# Patient Record
Sex: FEMALE | Race: WHITE | NOT HISPANIC OR LATINO | Employment: FULL TIME | URBAN - METROPOLITAN AREA
[De-identification: names, ages, dates, MRNs, and addresses within clinical notes are randomized per-mention and may not be internally consistent; named-entity substitution may affect disease eponyms.]

---

## 2023-04-12 ENCOUNTER — TELEPHONE (OUTPATIENT)
Dept: PRIMARY CARE | Facility: CLINIC | Age: 43
End: 2023-04-12
Payer: COMMERCIAL

## 2023-04-12 NOTE — TELEPHONE ENCOUNTER
Pt states that she feels that she probably had covid this past weekend. Pt did not test herself but says she felt similar to a few months back when she had covid. Pt is requesting steroids. She says that she has productive cough, pressure in her head, light headedness, and green drainage. Pt is aware that Dr. Benites is not in the office right now. Please advise.  [phone]

## 2023-04-18 ENCOUNTER — PATIENT MESSAGE (OUTPATIENT)
Dept: PRIMARY CARE | Facility: CLINIC | Age: 43
End: 2023-04-18
Payer: COMMERCIAL

## 2023-04-18 DIAGNOSIS — E03.9 HYPOTHYROIDISM, UNSPECIFIED TYPE: Primary | ICD-10-CM

## 2023-04-18 DIAGNOSIS — K21.9 GASTROESOPHAGEAL REFLUX DISEASE WITHOUT ESOPHAGITIS: ICD-10-CM

## 2023-04-18 RX ORDER — PANTOPRAZOLE SODIUM 20 MG/1
1 TABLET, DELAYED RELEASE ORAL DAILY
COMMUNITY
Start: 2022-06-24 | End: 2023-04-18 | Stop reason: SDUPTHER

## 2023-04-18 RX ORDER — PANTOPRAZOLE SODIUM 20 MG/1
20 TABLET, DELAYED RELEASE ORAL DAILY
Qty: 90 TABLET | Refills: 0 | Status: SHIPPED | OUTPATIENT
Start: 2023-04-18 | End: 2023-05-12 | Stop reason: SDUPTHER

## 2023-04-18 RX ORDER — LEVOTHYROXINE SODIUM 25 UG/1
25 TABLET ORAL DAILY
COMMUNITY
End: 2023-04-18 | Stop reason: SDUPTHER

## 2023-04-18 RX ORDER — LEVOTHYROXINE SODIUM 25 UG/1
25 TABLET ORAL DAILY
Qty: 90 TABLET | Refills: 0 | Status: SHIPPED | OUTPATIENT
Start: 2023-04-18 | End: 2023-07-14 | Stop reason: SDUPTHER

## 2023-05-12 ENCOUNTER — OFFICE VISIT (OUTPATIENT)
Dept: PRIMARY CARE | Facility: CLINIC | Age: 43
End: 2023-05-12
Payer: COMMERCIAL

## 2023-05-12 ENCOUNTER — LAB (OUTPATIENT)
Dept: LAB | Facility: LAB | Age: 43
End: 2023-05-12
Payer: COMMERCIAL

## 2023-05-12 VITALS
BODY MASS INDEX: 23.74 KG/M2 | SYSTOLIC BLOOD PRESSURE: 98 MMHG | HEIGHT: 62 IN | RESPIRATION RATE: 16 BRPM | TEMPERATURE: 98.7 F | DIASTOLIC BLOOD PRESSURE: 64 MMHG | WEIGHT: 129 LBS | HEART RATE: 71 BPM | OXYGEN SATURATION: 98 %

## 2023-05-12 DIAGNOSIS — E03.9 HYPOTHYROIDISM, UNSPECIFIED TYPE: Primary | ICD-10-CM

## 2023-05-12 DIAGNOSIS — K21.9 GASTROESOPHAGEAL REFLUX DISEASE WITHOUT ESOPHAGITIS: ICD-10-CM

## 2023-05-12 DIAGNOSIS — E03.9 HYPOTHYROIDISM, UNSPECIFIED TYPE: ICD-10-CM

## 2023-05-12 DIAGNOSIS — F90.0 ATTENTION DEFICIT HYPERACTIVITY DISORDER (ADHD), PREDOMINANTLY INATTENTIVE TYPE: ICD-10-CM

## 2023-05-12 DIAGNOSIS — J30.2 SEASONAL ALLERGIES: ICD-10-CM

## 2023-05-12 PROBLEM — F90.9 ADHD: Status: ACTIVE | Noted: 2023-05-12

## 2023-05-12 PROBLEM — K58.9 IBS (IRRITABLE BOWEL SYNDROME): Status: ACTIVE | Noted: 2023-05-12

## 2023-05-12 PROBLEM — K90.41 GLUTEN-SENSITIVE ENTEROPATHY: Status: ACTIVE | Noted: 2023-05-12

## 2023-05-12 PROBLEM — K31.7 GASTRIC POLYP: Status: ACTIVE | Noted: 2023-05-12

## 2023-05-12 PROBLEM — R79.0 LOW IRON STORES: Status: ACTIVE | Noted: 2023-05-12

## 2023-05-12 PROBLEM — G25.81 RESTLESS LEG SYNDROME: Status: ACTIVE | Noted: 2023-05-12

## 2023-05-12 LAB
THYROTROPIN (MIU/L) IN SER/PLAS BY DETECTION LIMIT <= 0.05 MIU/L: 2.2 MIU/L (ref 0.44–3.98)
THYROXINE (T4) FREE (NG/DL) IN SER/PLAS: 1.06 NG/DL (ref 0.61–1.12)

## 2023-05-12 PROCEDURE — 99214 OFFICE O/P EST MOD 30 MIN: CPT | Performed by: FAMILY MEDICINE

## 2023-05-12 PROCEDURE — 84443 ASSAY THYROID STIM HORMONE: CPT

## 2023-05-12 PROCEDURE — 36415 COLL VENOUS BLD VENIPUNCTURE: CPT

## 2023-05-12 PROCEDURE — 84439 ASSAY OF FREE THYROXINE: CPT

## 2023-05-12 RX ORDER — LEVOCETIRIZINE DIHYDROCHLORIDE 5 MG/1
TABLET, FILM COATED ORAL EVERY EVENING
COMMUNITY
End: 2023-05-12 | Stop reason: SDUPTHER

## 2023-05-12 RX ORDER — LEVOCETIRIZINE DIHYDROCHLORIDE 5 MG/1
5 TABLET, FILM COATED ORAL EVERY EVENING
Qty: 90 TABLET | Refills: 1 | Status: SHIPPED | OUTPATIENT
Start: 2023-05-12 | End: 2023-07-14 | Stop reason: ALTCHOICE

## 2023-05-12 RX ORDER — AZELASTINE 1 MG/ML
1 SPRAY, METERED NASAL 2 TIMES DAILY
Qty: 36 ML | Refills: 3 | Status: SHIPPED | OUTPATIENT
Start: 2023-05-12 | End: 2023-06-06 | Stop reason: SDUPTHER

## 2023-05-12 RX ORDER — DEXTROAMPHETAMINE SULFATE, DEXTROAMPHETAMINE SACCHARATE, AMPHETAMINE SULFATE AND AMPHETAMINE ASPARTATE 5; 5; 5; 5 MG/1; MG/1; MG/1; MG/1
20 CAPSULE, EXTENDED RELEASE ORAL EVERY MORNING
COMMUNITY
Start: 2023-04-17 | End: 2023-10-23 | Stop reason: SDUPTHER

## 2023-05-12 RX ORDER — PANTOPRAZOLE SODIUM 20 MG/1
20 TABLET, DELAYED RELEASE ORAL DAILY
Qty: 90 TABLET | Refills: 1 | Status: SHIPPED | OUTPATIENT
Start: 2023-05-12 | End: 2023-12-26

## 2023-05-12 RX ORDER — GABAPENTIN 100 MG/1
100 CAPSULE ORAL 3 TIMES DAILY
COMMUNITY
End: 2023-09-29 | Stop reason: SDUPTHER

## 2023-05-12 RX ORDER — LEVONORGESTREL 52 MG/1
INTRAUTERINE DEVICE INTRAUTERINE
COMMUNITY

## 2023-05-12 RX ORDER — AZELASTINE 1 MG/ML
1 SPRAY, METERED NASAL 2 TIMES DAILY
COMMUNITY
End: 2023-05-12 | Stop reason: SDUPTHER

## 2023-05-12 NOTE — PATIENT INSTRUCTIONS
Today we followed up on your Thyroid medication - we will check your labs and adjust the medication accordingly.  Our goal will be to have a TSH between 2 and 3.  We will closely monitor your symptoms to determine the optimal dosing.     We also followed up on your seasonal allergies and refilled your nasal spray  and xyzal    For your GERD I refilled your PPI and referred you to GI since you had gastric polyps in the past.    Please make an appointment to follow up for your Annual Physical.     We will take over prescribing your adhd meds once you are done with your last psychiarist appt in June. Please bring me a clinical summary to your next appt of your evaluation from your psychiatrist.

## 2023-05-12 NOTE — PROGRESS NOTES
"Subjective   Patient ID: Yaneli Cain is a 43 y.o. female who presents for Thyroid Problem and Allergies.    She had stopped her meds for a couple of months and felt tired but has been on it regularly now for a few months.     The nephrologist was happy with her labs her creatinine had jumped but it's back to normal now. She had a 24 hour urine done and didn't have enough protein to be really concerned on.  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  She is doing well on 20mg now.  She was on steroids recently for a sinus infection and did a virtual visit with [facility].  She had to increase her protonix during that time.      Her GERD has been going on since her 20's.  It would get severe with just a few bites of salsa.  She had an EGD in med school and some delayed gastric emptying was determine.  She has been on protonix for years now.  She had an EGD at [facility] showing some polyps in 2015 and she had a cscope at the time as well because her mom has ulcerative colitis.    Thyroid Problem         Review of Systems    Objective   BP 98/64   Pulse 71   Temp 37.1 °C (98.7 °F)   Resp 16   Ht 1.575 m (5' 2\")   Wt 58.5 kg (129 lb)   SpO2 98%   BMI 23.59 kg/m²     Physical Exam  Constitutional:       Appearance: Normal appearance.   HENT:      Head: Normocephalic and atraumatic.      Mouth/Throat:      Mouth: Mucous membranes are moist.   Eyes:      Pupils: Pupils are equal, round, and reactive to light.   Cardiovascular:      Rate and Rhythm: Normal rate and regular rhythm.      Pulses: Normal pulses.   Pulmonary:      Effort: Pulmonary effort is normal.      Breath sounds: Normal breath sounds.   Abdominal:      General: Bowel sounds are normal. There is no distension.      Palpations: There is no mass.      Tenderness: There is no abdominal tenderness. There is no guarding.   Musculoskeletal:      Cervical back: Normal range of motion and neck supple.   Neurological:      Mental Status: She is " alert.         Assessment/Plan   Diagnoses and all orders for this visit:  Hypothyroidism, unspecified type  -     Thyroxine, Free; Future  -     Thyroid Stimulating Hormone; Future  Seasonal allergies  -     azelastine (Astelin) 137 mcg (0.1 %) nasal spray; Administer 1 spray into each nostril 2 times a day. Use in each nostril as directed  -     levocetirizine (Xyzal) 5 mg tablet; Take 1 tablet (5 mg) by mouth once daily in the evening.  Gastroesophageal reflux disease without esophagitis  -     Referral to Gastroenterology; Future  -     pantoprazole (ProtoNix) 20 mg EC tablet; Take 1 tablet (20 mg) by mouth once daily.

## 2023-06-06 DIAGNOSIS — J30.2 SEASONAL ALLERGIES: ICD-10-CM

## 2023-06-06 RX ORDER — AZELASTINE 1 MG/ML
1 SPRAY, METERED NASAL 2 TIMES DAILY
Qty: 3 ML | Refills: 3 | Status: SHIPPED | OUTPATIENT
Start: 2023-06-06 | End: 2024-06-05

## 2023-06-06 NOTE — TELEPHONE ENCOUNTER
Duplicate, refuse. -NA   V-Y Plasty Text: The defect edges were debeveled with a #15 scalpel blade. Given the location of the defect, shape of the defect and the proximity to free margins an V-Y advancement flap was deemed most appropriate. Using a sterile surgical marker, an appropriate advancement flap was drawn incorporating the defect and placing the expected incisions within the relaxed skin tension lines where possible. The area thus outlined was incised deep to adipose tissue with a #15 scalpel blade. The skin margins were undermined to an appropriate distance in all directions utilizing iris scissors.

## 2023-07-14 ENCOUNTER — OFFICE VISIT (OUTPATIENT)
Dept: PRIMARY CARE | Facility: CLINIC | Age: 43
End: 2023-07-14
Payer: COMMERCIAL

## 2023-07-14 VITALS
OXYGEN SATURATION: 98 % | BODY MASS INDEX: 23.74 KG/M2 | DIASTOLIC BLOOD PRESSURE: 72 MMHG | HEART RATE: 72 BPM | RESPIRATION RATE: 16 BRPM | HEIGHT: 62 IN | WEIGHT: 129 LBS | TEMPERATURE: 98.4 F | SYSTOLIC BLOOD PRESSURE: 102 MMHG

## 2023-07-14 DIAGNOSIS — J30.2 SEASONAL ALLERGIES: ICD-10-CM

## 2023-07-14 DIAGNOSIS — F90.0 ATTENTION DEFICIT HYPERACTIVITY DISORDER (ADHD), PREDOMINANTLY INATTENTIVE TYPE: ICD-10-CM

## 2023-07-14 DIAGNOSIS — Z12.31 BREAST CANCER SCREENING BY MAMMOGRAM: Primary | ICD-10-CM

## 2023-07-14 DIAGNOSIS — Z00.00 HEALTHCARE MAINTENANCE: ICD-10-CM

## 2023-07-14 DIAGNOSIS — E03.9 HYPOTHYROIDISM, UNSPECIFIED TYPE: ICD-10-CM

## 2023-07-14 LAB
AMPHETAMINE (PRESENCE) IN URINE BY SCREEN METHOD: ABNORMAL
BARBITURATES PRESENCE IN URINE BY SCREEN METHOD: ABNORMAL
BENZODIAZEPINE (PRESENCE) IN URINE BY SCREEN METHOD: ABNORMAL
CANNABINOIDS IN URINE BY SCREEN METHOD: ABNORMAL
COCAINE (PRESENCE) IN URINE BY SCREEN METHOD: ABNORMAL
DRUG SCREEN COMMENT URINE: ABNORMAL
FENTANYL URINE: ABNORMAL
METHADONE (PRESENCE) IN URINE BY SCREEN METHOD: ABNORMAL
OPIATES (PRESENCE) IN URINE BY SCREEN METHOD: ABNORMAL
OXYCODONE (PRESENCE) IN URINE BY SCREEN METHOD: ABNORMAL
PHENCYCLIDINE (PRESENCE) IN URINE BY SCREEN METHOD: ABNORMAL
POC APPEARANCE, URINE: CLEAR
POC BILIRUBIN, URINE: NEGATIVE
POC BLOOD, URINE: NEGATIVE
POC COLOR, URINE: YELLOW
POC GLUCOSE, URINE: NEGATIVE MG/DL
POC KETONES, URINE: NEGATIVE MG/DL
POC LEUKOCYTES, URINE: NEGATIVE
POC NITRITE,URINE: NEGATIVE
POC PH, URINE: 6 PH
POC PROTEIN, URINE: NEGATIVE MG/DL
POC SPECIFIC GRAVITY, URINE: <=1.005
POC UROBILINOGEN, URINE: 0.2 EU/DL

## 2023-07-14 PROCEDURE — 1036F TOBACCO NON-USER: CPT | Performed by: FAMILY MEDICINE

## 2023-07-14 PROCEDURE — 81002 URINALYSIS NONAUTO W/O SCOPE: CPT | Performed by: FAMILY MEDICINE

## 2023-07-14 PROCEDURE — 99396 PREV VISIT EST AGE 40-64: CPT | Performed by: FAMILY MEDICINE

## 2023-07-14 PROCEDURE — 80324 DRUG SCREEN AMPHETAMINES 1/2: CPT

## 2023-07-14 PROCEDURE — 80307 DRUG TEST PRSMV CHEM ANLYZR: CPT

## 2023-07-14 RX ORDER — CALCIUM CARBONATE 300MG(750)
400 TABLET,CHEWABLE ORAL DAILY
COMMUNITY

## 2023-07-14 RX ORDER — LEVOCETIRIZINE DIHYDROCHLORIDE 5 MG/1
10 TABLET, FILM COATED ORAL EVERY EVENING
Qty: 180 TABLET | Refills: 1 | Status: SHIPPED | OUTPATIENT
Start: 2023-07-14 | End: 2023-09-29 | Stop reason: SDUPTHER

## 2023-07-14 RX ORDER — LEVOTHYROXINE SODIUM 25 UG/1
25 TABLET ORAL DAILY
Qty: 90 TABLET | Refills: 1 | Status: SHIPPED | OUTPATIENT
Start: 2023-07-14 | End: 2023-12-26

## 2023-07-14 NOTE — PATIENT INSTRUCTIONS
Today we performed your Annual Physical Exam.  Your preventative health care, labs and vaccinations have been reviewed and are up to date.     Your vaccines are up to date.    Follow up in 6 months for a medication check    Follow up in 1 year for your Complete Physical Exam

## 2023-07-14 NOTE — PROGRESS NOTES
"Subjective   Patient ID: Yaneli Cain is a 43 y.o. female who presents for Annual Exam.    HPI   Dentist and Eye Doctor appointments: UTD  Immunizations: UTD  Diet: gluten free,high protein, lots of fruits and veggies  Exercise: Pilates, walks a lot, biking  Supplements: Magnesium and chamomile supplements, iron  Menstrual Cycles: light spotting,IUD  Sexually Active: Yes  Pregnancy History:   Cancer Screening:    Cervical: Dr. Armando   Breast: due   Colon: seeing GI Monday for GERD; had a cscope in the past   Skin: wears sunscreen   DEXA: N/A         Review of Systems    Objective   /72   Pulse 72   Temp 36.9 °C (98.4 °F)   Resp 16   Ht 1.575 m (5' 2\")   Wt 58.5 kg (129 lb)   SpO2 98%   BMI 23.59 kg/m²     Physical Exam  Constitutional:       General: She is not in acute distress.     Appearance: She is well-developed. She is not diaphoretic.   HENT:      Head: Normocephalic and atraumatic.      Right Ear: Tympanic membrane normal.      Left Ear: Tympanic membrane normal.      Nose: Nose normal.      Mouth/Throat:      Mouth: Mucous membranes are moist.   Eyes:      General: No scleral icterus.     Pupils: Pupils are equal, round, and reactive to light.   Neck:      Thyroid: No thyromegaly.      Vascular: No JVD.   Cardiovascular:      Rate and Rhythm: Normal rate and regular rhythm.      Heart sounds: Normal heart sounds. No murmur heard.     No friction rub. No gallop.   Pulmonary:      Effort: Pulmonary effort is normal. No respiratory distress.      Breath sounds: Normal breath sounds. No wheezing or rales.   Chest:      Chest wall: No tenderness.   Abdominal:      General: Bowel sounds are normal. There is no distension.      Palpations: Abdomen is soft. There is no mass.      Tenderness: There is no abdominal tenderness. There is no rebound.   Musculoskeletal:         General: Normal range of motion.      Cervical back: Normal range of motion and neck supple.   Lymphadenopathy:      Cervical: " No cervical adenopathy.   Skin:     General: Skin is warm and dry.   Neurological:      General: No focal deficit present.      Mental Status: She is alert and oriented to person, place, and time.      Deep Tendon Reflexes: Reflexes normal.   Psychiatric:         Mood and Affect: Mood normal.         Behavior: Behavior normal.         Assessment/Plan   Diagnoses and all orders for this visit:  Breast cancer screening by mammogram  -     BI mammo bilateral screening tomosynthesis; Future  Healthcare maintenance  -     POCT UA (nonautomated) manually resulted  -     CBC; Future  -     Comprehensive Metabolic Panel; Future  -     Lipid Panel; Future  -     Vitamin D 25 hydroxy; Future  Hypothyroidism, unspecified type  -     levothyroxine (Synthroid, Levoxyl) 25 mcg tablet; Take 1 tablet (25 mcg) by mouth once daily. as directed  Seasonal allergies  -     levocetirizine (Xyzal) 5 mg tablet; Take 2 tablets (10 mg) by mouth once daily in the evening.

## 2023-07-19 LAB
AMPHETAMINES,URINE: 1748 NG/ML
MDA,URINE: <200 NG/ML
MDEA,URINE: <200 NG/ML
MDMA,UR: <200 NG/ML
METHAMPHETAMINE QUANTITATIVE URINE: <200 NG/ML
PHENTERMINE,UR: <200 NG/ML

## 2023-07-28 DIAGNOSIS — J30.2 SEASONAL ALLERGIES: Primary | ICD-10-CM

## 2023-07-28 RX ORDER — METHYLPREDNISOLONE 4 MG/1
TABLET ORAL
Qty: 21 TABLET | Refills: 0 | Status: SHIPPED | OUTPATIENT
Start: 2023-07-28 | End: 2023-08-04

## 2023-09-29 ENCOUNTER — OFFICE VISIT (OUTPATIENT)
Dept: PRIMARY CARE | Facility: CLINIC | Age: 43
End: 2023-09-29
Payer: COMMERCIAL

## 2023-09-29 VITALS
SYSTOLIC BLOOD PRESSURE: 118 MMHG | OXYGEN SATURATION: 100 % | HEART RATE: 80 BPM | BODY MASS INDEX: 24.11 KG/M2 | TEMPERATURE: 98.2 F | HEIGHT: 62 IN | WEIGHT: 131 LBS | DIASTOLIC BLOOD PRESSURE: 68 MMHG

## 2023-09-29 DIAGNOSIS — F90.0 ATTENTION DEFICIT HYPERACTIVITY DISORDER (ADHD), PREDOMINANTLY INATTENTIVE TYPE: Primary | ICD-10-CM

## 2023-09-29 DIAGNOSIS — E03.9 HYPOTHYROIDISM, UNSPECIFIED TYPE: ICD-10-CM

## 2023-09-29 DIAGNOSIS — G25.81 RESTLESS LEG SYNDROME: ICD-10-CM

## 2023-09-29 DIAGNOSIS — J32.9 RECURRENT SINUS INFECTIONS: ICD-10-CM

## 2023-09-29 DIAGNOSIS — J30.2 SEASONAL ALLERGIES: ICD-10-CM

## 2023-09-29 PROCEDURE — 99214 OFFICE O/P EST MOD 30 MIN: CPT | Performed by: FAMILY MEDICINE

## 2023-09-29 PROCEDURE — 90471 IMMUNIZATION ADMIN: CPT | Performed by: FAMILY MEDICINE

## 2023-09-29 PROCEDURE — 1036F TOBACCO NON-USER: CPT | Performed by: FAMILY MEDICINE

## 2023-09-29 PROCEDURE — 90686 IIV4 VACC NO PRSV 0.5 ML IM: CPT | Performed by: FAMILY MEDICINE

## 2023-09-29 RX ORDER — CETIRIZINE HYDROCHLORIDE 10 MG/1
TABLET ORAL
COMMUNITY
Start: 2022-06-24 | End: 2024-01-31 | Stop reason: WASHOUT

## 2023-09-29 RX ORDER — LEVOCETIRIZINE DIHYDROCHLORIDE 5 MG/1
10 TABLET, FILM COATED ORAL EVERY EVENING
Qty: 180 TABLET | Refills: 1 | Status: SHIPPED | OUTPATIENT
Start: 2023-09-29 | End: 2023-09-30 | Stop reason: SDUPTHER

## 2023-09-29 RX ORDER — FLUTICASONE PROPIONATE 50 MCG
SPRAY, SUSPENSION (ML) NASAL EVERY 24 HOURS
COMMUNITY

## 2023-09-29 RX ORDER — ALBUTEROL SULFATE 90 UG/1
AEROSOL, METERED RESPIRATORY (INHALATION) EVERY 4 HOURS
COMMUNITY

## 2023-09-29 RX ORDER — GABAPENTIN 100 MG/1
100 CAPSULE ORAL 3 TIMES DAILY
Qty: 90 CAPSULE | Refills: 1 | Status: SHIPPED | OUTPATIENT
Start: 2023-09-29 | End: 2024-01-31 | Stop reason: SDUPTHER

## 2023-09-29 NOTE — PROGRESS NOTES
"Subjective   Patient ID: Yaneli Cain is a 43 y.o. female who presents for Medication Follow up (Adderral) and Sinusitis (Requesting ENT referral ).    She has been doing adderall for her ADHD and it is working well. She was seeing a psychiatrist in the past.      She is on gabapentin for restless leg and takes it every night for the last 6 months or so    Sinusitis         Review of Systems    Objective   /68   Pulse 80   Temp 36.8 °C (98.2 °F) (Temporal)   Ht 1.581 m (5' 2.25\")   Wt 59.4 kg (131 lb)   SpO2 100%   BMI 23.77 kg/m²     Physical Exam  Constitutional:       Appearance: Normal appearance.   HENT:      Head: Normocephalic.      Mouth/Throat:      Mouth: Mucous membranes are moist.   Eyes:      Pupils: Pupils are equal, round, and reactive to light.   Cardiovascular:      Rate and Rhythm: Normal rate and regular rhythm.   Pulmonary:      Effort: Pulmonary effort is normal.      Breath sounds: Normal breath sounds.   Musculoskeletal:      Cervical back: Normal range of motion.   Skin:     General: Skin is warm.   Neurological:      General: No focal deficit present.      Mental Status: She is alert and oriented to person, place, and time.   Psychiatric:         Mood and Affect: Mood normal.         Assessment/Plan   Diagnoses and all orders for this visit:  Attention deficit hyperactivity disorder (ADHD), predominantly inattentive type  Seasonal allergies  -     levocetirizine (Xyzal) 5 mg tablet; Take 2 tablets (10 mg) by mouth once daily in the evening.  Restless leg syndrome  -     gabapentin (Neurontin) 100 mg capsule; Take 1 capsule (100 mg) by mouth 3 times a day.  -     Iron and TIBC; Future  Hypothyroidism, unspecified type  -     Thyroxine, Free; Future  -     Thyroid Stimulating Hormone; Future  Recurrent sinus infections  -     Referral to ENT; Future  Other orders  -     Flu vaccine (IIV4) age 6 months and greater, preservative free         "

## 2023-09-29 NOTE — PATIENT INSTRUCTIONS
Today we followed up on your ADHD medication.  You are doing well.      Follow up in 3 months to monitor your medication    For your recurrent sinus infections and serous otitis I have referred you to ENT for consultation    You were given a seasonal Flu vaccine today     For your Restless leg I have refilled your gabapentin and advised you to get your labs done from your visit in July    Today we followed up on your Thyroid medication - we will check your labs and adjust the medication accordingly.  Our goal will be to have a TSH between 2 and 3.  We will closely monitor your symptoms to determine the optimal dosing.     For your seasonal and environmental allergies I have refilled our xyzal

## 2023-10-06 ENCOUNTER — LAB (OUTPATIENT)
Dept: LAB | Facility: LAB | Age: 43
End: 2023-10-06
Payer: COMMERCIAL

## 2023-10-06 DIAGNOSIS — E03.9 HYPOTHYROIDISM, UNSPECIFIED TYPE: ICD-10-CM

## 2023-10-06 DIAGNOSIS — G25.81 RESTLESS LEG SYNDROME: ICD-10-CM

## 2023-10-06 DIAGNOSIS — Z00.00 HEALTHCARE MAINTENANCE: ICD-10-CM

## 2023-10-06 LAB
25(OH)D3 SERPL-MCNC: 52 NG/ML (ref 30–100)
ALBUMIN SERPL BCP-MCNC: 4.3 G/DL (ref 3.4–5)
ALP SERPL-CCNC: 65 U/L (ref 33–110)
ALT SERPL W P-5'-P-CCNC: 14 U/L (ref 7–45)
ANION GAP SERPL CALC-SCNC: 12 MMOL/L (ref 10–20)
AST SERPL W P-5'-P-CCNC: 20 U/L (ref 9–39)
BILIRUB SERPL-MCNC: 1 MG/DL (ref 0–1.2)
BUN SERPL-MCNC: 14 MG/DL (ref 6–23)
CALCIUM SERPL-MCNC: 9.7 MG/DL (ref 8.6–10.3)
CHLORIDE SERPL-SCNC: 101 MMOL/L (ref 98–107)
CHOLEST SERPL-MCNC: 128 MG/DL (ref 0–199)
CHOLESTEROL/HDL RATIO: 3.1
CO2 SERPL-SCNC: 28 MMOL/L (ref 21–32)
CREAT SERPL-MCNC: 1.14 MG/DL (ref 0.5–1.05)
ERYTHROCYTE [DISTWIDTH] IN BLOOD BY AUTOMATED COUNT: 12.5 % (ref 11.5–14.5)
GFR SERPL CREATININE-BSD FRML MDRD: 62 ML/MIN/1.73M*2
GLUCOSE SERPL-MCNC: 77 MG/DL (ref 74–99)
HCT VFR BLD AUTO: 40.9 % (ref 36–46)
HDLC SERPL-MCNC: 41.1 MG/DL
HGB BLD-MCNC: 13.2 G/DL (ref 12–16)
IRON SATN MFR SERPL: 38 % (ref 25–45)
IRON SERPL-MCNC: 125 UG/DL (ref 35–150)
LDLC SERPL CALC-MCNC: 70 MG/DL (ref 140–190)
MCH RBC QN AUTO: 30.4 PG (ref 26–34)
MCHC RBC AUTO-ENTMCNC: 32.3 G/DL (ref 32–36)
MCV RBC AUTO: 94 FL (ref 80–100)
NON HDL CHOLESTEROL: 87 MG/DL (ref 0–149)
NRBC BLD-RTO: 0 /100 WBCS (ref 0–0)
PLATELET # BLD AUTO: 502 X10*3/UL (ref 150–450)
PMV BLD AUTO: 10.6 FL (ref 7.5–11.5)
POTASSIUM SERPL-SCNC: 4.6 MMOL/L (ref 3.5–5.3)
PROT SERPL-MCNC: 7.1 G/DL (ref 6.4–8.2)
RBC # BLD AUTO: 4.34 X10*6/UL (ref 4–5.2)
SODIUM SERPL-SCNC: 136 MMOL/L (ref 136–145)
T4 FREE SERPL-MCNC: 0.83 NG/DL (ref 0.61–1.12)
TIBC SERPL-MCNC: 327 UG/DL (ref 240–445)
TRIGL SERPL-MCNC: 87 MG/DL (ref 0–149)
TSH SERPL-ACNC: 2.69 MIU/L (ref 0.44–3.98)
UIBC SERPL-MCNC: 202 UG/DL (ref 110–370)
VLDL: 17 MG/DL (ref 0–40)
WBC # BLD AUTO: 11 X10*3/UL (ref 4.4–11.3)

## 2023-10-06 PROCEDURE — 83540 ASSAY OF IRON: CPT

## 2023-10-06 PROCEDURE — 82306 VITAMIN D 25 HYDROXY: CPT

## 2023-10-06 PROCEDURE — 84439 ASSAY OF FREE THYROXINE: CPT

## 2023-10-06 PROCEDURE — 83550 IRON BINDING TEST: CPT

## 2023-10-06 PROCEDURE — 84443 ASSAY THYROID STIM HORMONE: CPT

## 2023-10-06 PROCEDURE — 80053 COMPREHEN METABOLIC PANEL: CPT

## 2023-10-06 PROCEDURE — 80061 LIPID PANEL: CPT

## 2023-10-06 PROCEDURE — 85027 COMPLETE CBC AUTOMATED: CPT

## 2023-10-06 PROCEDURE — 36415 COLL VENOUS BLD VENIPUNCTURE: CPT

## 2023-10-20 ENCOUNTER — PATIENT MESSAGE (OUTPATIENT)
Dept: PRIMARY CARE | Facility: CLINIC | Age: 43
End: 2023-10-20
Payer: COMMERCIAL

## 2023-10-20 DIAGNOSIS — F90.0 ATTENTION DEFICIT HYPERACTIVITY DISORDER (ADHD), PREDOMINANTLY INATTENTIVE TYPE: Primary | ICD-10-CM

## 2023-10-23 RX ORDER — DEXTROAMPHETAMINE SACCHARATE, AMPHETAMINE ASPARTATE MONOHYDRATE, DEXTROAMPHETAMINE SULFATE AND AMPHETAMINE SULFATE 5; 5; 5; 5 MG/1; MG/1; MG/1; MG/1
20 CAPSULE, EXTENDED RELEASE ORAL EVERY MORNING
Qty: 90 CAPSULE | Refills: 0 | Status: SHIPPED | OUTPATIENT
Start: 2023-10-23 | End: 2024-01-31 | Stop reason: SDUPTHER

## 2023-12-26 DIAGNOSIS — K21.9 GASTROESOPHAGEAL REFLUX DISEASE WITHOUT ESOPHAGITIS: ICD-10-CM

## 2023-12-26 DIAGNOSIS — E03.9 HYPOTHYROIDISM, UNSPECIFIED TYPE: ICD-10-CM

## 2023-12-26 RX ORDER — LEVOTHYROXINE SODIUM 25 UG/1
25 TABLET ORAL DAILY
Qty: 90 TABLET | Refills: 0 | Status: SHIPPED | OUTPATIENT
Start: 2023-12-26 | End: 2024-01-31 | Stop reason: SDUPTHER

## 2023-12-26 RX ORDER — PANTOPRAZOLE SODIUM 20 MG/1
20 TABLET, DELAYED RELEASE ORAL DAILY
Qty: 90 TABLET | Refills: 0 | Status: SHIPPED | OUTPATIENT
Start: 2023-12-26 | End: 2024-01-31 | Stop reason: SDUPTHER

## 2024-01-12 ENCOUNTER — APPOINTMENT (OUTPATIENT)
Dept: PRIMARY CARE | Facility: CLINIC | Age: 44
End: 2024-01-12
Payer: COMMERCIAL

## 2024-01-31 ENCOUNTER — OFFICE VISIT (OUTPATIENT)
Dept: PRIMARY CARE | Facility: CLINIC | Age: 44
End: 2024-01-31
Payer: COMMERCIAL

## 2024-01-31 VITALS
SYSTOLIC BLOOD PRESSURE: 100 MMHG | TEMPERATURE: 98 F | DIASTOLIC BLOOD PRESSURE: 64 MMHG | BODY MASS INDEX: 23.59 KG/M2 | OXYGEN SATURATION: 100 % | HEART RATE: 57 BPM | RESPIRATION RATE: 16 BRPM | WEIGHT: 130 LBS

## 2024-01-31 DIAGNOSIS — G25.81 RESTLESS LEG SYNDROME: ICD-10-CM

## 2024-01-31 DIAGNOSIS — E03.9 HYPOTHYROIDISM, UNSPECIFIED TYPE: ICD-10-CM

## 2024-01-31 DIAGNOSIS — K21.9 GASTROESOPHAGEAL REFLUX DISEASE WITHOUT ESOPHAGITIS: ICD-10-CM

## 2024-01-31 DIAGNOSIS — F90.0 ATTENTION DEFICIT HYPERACTIVITY DISORDER (ADHD), PREDOMINANTLY INATTENTIVE TYPE: Primary | ICD-10-CM

## 2024-01-31 PROBLEM — N12 INTERSTITIAL NEPHRITIS: Status: ACTIVE | Noted: 2024-01-31

## 2024-01-31 PROBLEM — D75.839 THROMBOCYTOSIS: Status: ACTIVE | Noted: 2024-01-31

## 2024-01-31 PROBLEM — R79.89 ELEVATED SERUM CREATININE: Status: ACTIVE | Noted: 2024-01-31

## 2024-01-31 PROCEDURE — 99214 OFFICE O/P EST MOD 30 MIN: CPT | Performed by: FAMILY MEDICINE

## 2024-01-31 PROCEDURE — 1036F TOBACCO NON-USER: CPT | Performed by: FAMILY MEDICINE

## 2024-01-31 RX ORDER — PANTOPRAZOLE SODIUM 20 MG/1
20 TABLET, DELAYED RELEASE ORAL DAILY
Qty: 90 TABLET | Refills: 1 | Status: SHIPPED | OUTPATIENT
Start: 2024-01-31

## 2024-01-31 RX ORDER — GABAPENTIN 100 MG/1
100 CAPSULE ORAL 3 TIMES DAILY
Qty: 90 CAPSULE | Refills: 1 | Status: SHIPPED | OUTPATIENT
Start: 2024-01-31

## 2024-01-31 RX ORDER — LEVOTHYROXINE SODIUM 25 UG/1
25 TABLET ORAL DAILY
Qty: 90 TABLET | Refills: 1 | Status: SHIPPED | OUTPATIENT
Start: 2024-01-31

## 2024-01-31 RX ORDER — DEXTROAMPHETAMINE SACCHARATE, AMPHETAMINE ASPARTATE MONOHYDRATE, DEXTROAMPHETAMINE SULFATE AND AMPHETAMINE SULFATE 5; 5; 5; 5 MG/1; MG/1; MG/1; MG/1
20 CAPSULE, EXTENDED RELEASE ORAL EVERY MORNING
Qty: 90 CAPSULE | Refills: 0 | Status: SHIPPED | OUTPATIENT
Start: 2024-01-31 | End: 2024-04-29 | Stop reason: SDUPTHER

## 2024-01-31 NOTE — PATIENT INSTRUCTIONS
Today we followed up on your ADHD medication.  You are doing well.      Follow up in 6 months to monitor your medication and for your annual CPE    We refilled your PPI you are not due for another EGD/Cscope at this time    Your thyroid was normal on your last labs and so we have refilled this today as well.     For your restless leg I recommend adding back in iron and gabapentin has been refilled as well    
Home

## 2024-01-31 NOTE — PROGRESS NOTES
Subjective   Patient ID: Yaneli Cain is a 43 y.o. female who presents for ADHD.    HPI     Review of Systems    Objective   /64   Pulse 57   Temp 36.7 °C (98 °F)   Resp 16   Wt 59 kg (130 lb)   SpO2 100%   BMI 23.59 kg/m²     Physical Exam  Constitutional:       Appearance: Normal appearance.   HENT:      Head: Normocephalic.   Eyes:      Pupils: Pupils are equal, round, and reactive to light.   Cardiovascular:      Rate and Rhythm: Normal rate and regular rhythm.   Pulmonary:      Effort: Pulmonary effort is normal.      Breath sounds: Normal breath sounds.   Musculoskeletal:      Cervical back: Normal range of motion.   Skin:     General: Skin is warm.   Neurological:      General: No focal deficit present.      Mental Status: She is alert and oriented to person, place, and time.   Psychiatric:         Mood and Affect: Mood normal.         Assessment/Plan   Diagnoses and all orders for this visit:  Attention deficit hyperactivity disorder (ADHD), predominantly inattentive type  -     amphetamine-dextroamphetamine XR (Adderall XR) 20 mg 24 hr capsule; Take 1 capsule (20 mg) by mouth once daily in the morning.  -     6 months - Follow Up In [facility] - PCP; Future  Restless leg syndrome  -     gabapentin (Neurontin) 100 mg capsule; Take 1 capsule (100 mg) by mouth 3 times a day.  Gastroesophageal reflux disease without esophagitis  -     pantoprazole (ProtoNix) 20 mg EC tablet; Take 1 tablet (20 mg) by mouth once daily.  Hypothyroidism, unspecified type  -     levothyroxine (Synthroid, Levoxyl) 25 mcg tablet; Take 1 tablet (25 mcg) by mouth once daily. as directed

## 2024-03-01 ENCOUNTER — APPOINTMENT (OUTPATIENT)
Dept: RADIOLOGY | Facility: CLINIC | Age: 44
End: 2024-03-01
Payer: COMMERCIAL

## 2024-03-08 ENCOUNTER — APPOINTMENT (OUTPATIENT)
Dept: RADIOLOGY | Facility: CLINIC | Age: 44
End: 2024-03-08
Payer: COMMERCIAL

## 2024-03-22 ENCOUNTER — HOSPITAL ENCOUNTER (OUTPATIENT)
Dept: RADIOLOGY | Facility: CLINIC | Age: 44
Discharge: HOME | End: 2024-03-22
Payer: COMMERCIAL

## 2024-03-22 VITALS — WEIGHT: 130 LBS | BODY MASS INDEX: 23.92 KG/M2 | HEIGHT: 62 IN

## 2024-03-22 DIAGNOSIS — Z12.31 ENCOUNTER FOR SCREENING MAMMOGRAM FOR MALIGNANT NEOPLASM OF BREAST: ICD-10-CM

## 2024-03-22 PROCEDURE — 77067 SCR MAMMO BI INCL CAD: CPT

## 2024-03-22 PROCEDURE — 77067 SCR MAMMO BI INCL CAD: CPT | Mod: BILATERAL PROCEDURE | Performed by: RADIOLOGY

## 2024-03-22 PROCEDURE — 77063 BREAST TOMOSYNTHESIS BI: CPT | Mod: BILATERAL PROCEDURE | Performed by: RADIOLOGY

## 2024-03-25 DIAGNOSIS — J30.2 SEASONAL ALLERGIES: ICD-10-CM

## 2024-03-25 RX ORDER — LEVOCETIRIZINE DIHYDROCHLORIDE 5 MG/1
5 TABLET, FILM COATED ORAL EVERY EVENING
Qty: 90 TABLET | Refills: 3 | Status: SHIPPED | OUTPATIENT
Start: 2024-03-25

## 2024-04-28 ENCOUNTER — HOSPITAL ENCOUNTER (EMERGENCY)
Facility: HOSPITAL | Age: 44
Discharge: HOME | End: 2024-04-28
Attending: EMERGENCY MEDICINE
Payer: COMMERCIAL

## 2024-04-28 ENCOUNTER — PATIENT MESSAGE (OUTPATIENT)
Dept: PRIMARY CARE | Facility: CLINIC | Age: 44
End: 2024-04-28
Payer: COMMERCIAL

## 2024-04-28 VITALS
BODY MASS INDEX: 23.55 KG/M2 | DIASTOLIC BLOOD PRESSURE: 67 MMHG | TEMPERATURE: 97.7 F | HEART RATE: 60 BPM | OXYGEN SATURATION: 98 % | SYSTOLIC BLOOD PRESSURE: 106 MMHG | RESPIRATION RATE: 18 BRPM | HEIGHT: 62 IN | WEIGHT: 128 LBS

## 2024-04-28 DIAGNOSIS — F90.0 ATTENTION DEFICIT HYPERACTIVITY DISORDER (ADHD), PREDOMINANTLY INATTENTIVE TYPE: ICD-10-CM

## 2024-04-28 DIAGNOSIS — S61.217A LACERATION OF LEFT LITTLE FINGER WITHOUT FOREIGN BODY WITHOUT DAMAGE TO NAIL, INITIAL ENCOUNTER: Primary | ICD-10-CM

## 2024-04-28 PROCEDURE — 2500000005 HC RX 250 GENERAL PHARMACY W/O HCPCS: Performed by: STUDENT IN AN ORGANIZED HEALTH CARE EDUCATION/TRAINING PROGRAM

## 2024-04-28 PROCEDURE — 2500000001 HC RX 250 WO HCPCS SELF ADMINISTERED DRUGS (ALT 637 FOR MEDICARE OP): Performed by: STUDENT IN AN ORGANIZED HEALTH CARE EDUCATION/TRAINING PROGRAM

## 2024-04-28 PROCEDURE — 90715 TDAP VACCINE 7 YRS/> IM: CPT | Performed by: STUDENT IN AN ORGANIZED HEALTH CARE EDUCATION/TRAINING PROGRAM

## 2024-04-28 PROCEDURE — 99284 EMERGENCY DEPT VISIT MOD MDM: CPT | Performed by: EMERGENCY MEDICINE

## 2024-04-28 PROCEDURE — 90471 IMMUNIZATION ADMIN: CPT | Performed by: STUDENT IN AN ORGANIZED HEALTH CARE EDUCATION/TRAINING PROGRAM

## 2024-04-28 PROCEDURE — 99283 EMERGENCY DEPT VISIT LOW MDM: CPT | Mod: 25

## 2024-04-28 PROCEDURE — 12001 RPR S/N/AX/GEN/TRNK 2.5CM/<: CPT | Performed by: STUDENT IN AN ORGANIZED HEALTH CARE EDUCATION/TRAINING PROGRAM

## 2024-04-28 PROCEDURE — 2500000004 HC RX 250 GENERAL PHARMACY W/ HCPCS (ALT 636 FOR OP/ED): Performed by: STUDENT IN AN ORGANIZED HEALTH CARE EDUCATION/TRAINING PROGRAM

## 2024-04-28 RX ORDER — LIDOCAINE HYDROCHLORIDE 10 MG/ML
5 INJECTION, SOLUTION EPIDURAL; INFILTRATION; INTRACAUDAL; PERINEURAL ONCE
Status: COMPLETED | OUTPATIENT
Start: 2024-04-28 | End: 2024-04-28

## 2024-04-28 RX ORDER — BACITRACIN ZINC 500 UNIT/G
1 OINTMENT IN PACKET (EA) TOPICAL ONCE
Status: COMPLETED | OUTPATIENT
Start: 2024-04-28 | End: 2024-04-28

## 2024-04-28 RX ADMIN — LIDOCAINE HYDROCHLORIDE 50 MG: 10 INJECTION, SOLUTION EPIDURAL; INFILTRATION; INTRACAUDAL; PERINEURAL at 18:05

## 2024-04-28 RX ADMIN — BACITRACIN 1 APPLICATION: 500 OINTMENT TOPICAL at 18:05

## 2024-04-28 RX ADMIN — TETANUS TOXOID, REDUCED DIPHTHERIA TOXOID AND ACELLULAR PERTUSSIS VACCINE, ADSORBED 0.5 ML: 5; 2.5; 8; 8; 2.5 SUSPENSION INTRAMUSCULAR at 18:05

## 2024-04-28 ASSESSMENT — PAIN - FUNCTIONAL ASSESSMENT: PAIN_FUNCTIONAL_ASSESSMENT: 0-10

## 2024-04-28 ASSESSMENT — LIFESTYLE VARIABLES
TOTAL SCORE: 0
EVER FELT BAD OR GUILTY ABOUT YOUR DRINKING: NO
EVER HAD A DRINK FIRST THING IN THE MORNING TO STEADY YOUR NERVES TO GET RID OF A HANGOVER: NO
HAVE YOU EVER FELT YOU SHOULD CUT DOWN ON YOUR DRINKING: NO
HAVE PEOPLE ANNOYED YOU BY CRITICIZING YOUR DRINKING: NO

## 2024-04-28 ASSESSMENT — COLUMBIA-SUICIDE SEVERITY RATING SCALE - C-SSRS
6. HAVE YOU EVER DONE ANYTHING, STARTED TO DO ANYTHING, OR PREPARED TO DO ANYTHING TO END YOUR LIFE?: NO
1. IN THE PAST MONTH, HAVE YOU WISHED YOU WERE DEAD OR WISHED YOU COULD GO TO SLEEP AND NOT WAKE UP?: NO
2. HAVE YOU ACTUALLY HAD ANY THOUGHTS OF KILLING YOURSELF?: NO

## 2024-04-28 ASSESSMENT — PAIN DESCRIPTION - ORIENTATION: ORIENTATION: LEFT

## 2024-04-28 ASSESSMENT — PAIN DESCRIPTION - FREQUENCY: FREQUENCY: CONSTANT/CONTINUOUS

## 2024-04-28 ASSESSMENT — PAIN DESCRIPTION - LOCATION: LOCATION: FINGER (COMMENT WHICH ONE)

## 2024-04-28 ASSESSMENT — PAIN DESCRIPTION - DESCRIPTORS
DESCRIPTORS: THROBBING
DESCRIPTORS: THROBBING

## 2024-04-28 ASSESSMENT — PAIN DESCRIPTION - PAIN TYPE: TYPE: ACUTE PAIN

## 2024-04-28 ASSESSMENT — PAIN SCALES - GENERAL: PAINLEVEL_OUTOF10: 2

## 2024-04-28 NOTE — ED PROVIDER NOTES
EMERGENCY DEPARTMENT ENCOUNTER      Pt Name: Yaneli Cain  MRN: [id number]  Birthdate 1980  Date of evaluation: 2024  Provider: Arlette Rose DO    CHIEF COMPLAINT       Chief Complaint   Patient presents with    Finger Laceration     Cut finger while cutting box, knife went through the box and cut pt's pinky finger         HISTORY OF PRESENT ILLNESS    44-year-old female who presents to the emergency department after injuring her left pinky finger with a knife.  She states that she was going through a felt like box when the knife slipped and the point of it drove into her pinky finger.  She has been unable to control the bleeding since that time approximately 3 hours ago.  She is not on a blood thinner.  Last tetanus shot was 2018          Nursing Notes were reviewed.    PAST MEDICAL HISTORY     Past Medical History:   Diagnosis Date    ADHD 2023    GERD (gastroesophageal reflux disease) 2023    Hypothyroid 2023    Low iron stores 2023    Restless leg syndrome 2023    Treated with gabapentin         SURGICAL HISTORY       Past Surgical History:   Procedure Laterality Date     SECTION, LOW TRANSVERSE      TONSILLECTOMY      WISDOM TOOTH EXTRACTION           CURRENT MEDICATIONS       Previous Medications    ALBUTEROL 90 MCG/ACTUATION INHALER    every 4 hours.    AMPHETAMINE-DEXTROAMPHETAMINE XR (ADDERALL XR) 20 MG 24 HR CAPSULE    Take 1 capsule (20 mg) by mouth once daily in the morning.    AZELASTINE (ASTELIN) 137 MCG (0.1 %) NASAL SPRAY    ADMINISTER 1 SPRAY INTO EACH NOSTRIL 2 TIMES A DAY. USE IN EACH NOSTRIL AS DIRECTED    FLUTICASONE (FLONASE ALLERGY RELIEF) 50 MCG/ACTUATION NASAL SPRAY    once every 24 hours.    GABAPENTIN (NEURONTIN) 100 MG CAPSULE    Take 1 capsule (100 mg) by mouth 3 times a day.    IRON 18 MG TABLET    Take by mouth.    LEVOCETIRIZINE (XYZAL) 5 MG TABLET    TAKE 1 TABLET (5 MG) BY MOUTH DAILY IN THE EVENING    LEVONORGESTREL (MIRENA) 21  MCG/24 HOURS (8 YRS) 52 MG IUD    by intrauterine route.    LEVOTHYROXINE (SYNTHROID, LEVOXYL) 25 MCG TABLET    Take 1 tablet (25 mcg) by mouth once daily. as directed    MAGNESIUM OXIDE (MAG-OX) 400 MG TABLET    1 tablet (400 mg) once daily.    PANTOPRAZOLE (PROTONIX) 20 MG EC TABLET    Take 1 tablet (20 mg) by mouth once daily.       ALLERGIES     Other, Bupropion, and Morphine    FAMILY HISTORY       Family History   Problem Relation Name Age of Onset    Ulcerative colitis Mother      Hypertension Mother      Hyperlipidemia Father      Anxiety disorder Sister      No Known Problems Daughter      No Known Problems Daughter      Breast cancer Paternal Grandmother            SOCIAL HISTORY       Social History     Socioeconomic History    Marital status:      Spouse name: Not on file    Number of children: Not on file    Years of education: Not on file    Highest education level: Not on file   Occupational History    Occupation: Psychiatrist   Tobacco Use    Smoking status: Never    Smokeless tobacco: Never   Substance and Sexual Activity    Alcohol use: Yes     Comment: socially    Drug use: Never    Sexual activity: Yes     Partners: Male     Birth control/protection: I.U.D.   Other Topics Concern    Not on file   Social History Narrative    Not on file     Social Determinants of Health     Financial Resource Strain: Not on file   Food Insecurity: Not on file   Transportation Needs: Not on file   Physical Activity: Not on file   Stress: Not on file   Social Connections: Not on file   Intimate Partner Violence: Not on file   Housing Stability: Not on file       SCREENINGS                        PHYSICAL EXAM    (up to 7 for level 4, 8 or more for level 5)     ED Triage Vitals [04/28/24 1747]   Temperature Heart Rate Respirations BP   36.5 °C (97.7 °F) 65 18 112/70      Pulse Ox Temp Source Heart Rate Source Patient Position   98 % Tympanic Monitor Sitting      BP Location FiO2 (%)     Right arm --        Physical Exam  Vitals and nursing note reviewed.   Constitutional:       General: She is not in acute distress.     Appearance: She is well-developed.   HENT:      Head: Normocephalic and atraumatic.   Eyes:      Conjunctiva/sclera: Conjunctivae normal.   Cardiovascular:      Rate and Rhythm: Normal rate and regular rhythm.      Heart sounds: No murmur heard.  Pulmonary:      Effort: Pulmonary effort is normal. No respiratory distress.      Breath sounds: Normal breath sounds.   Abdominal:      Palpations: Abdomen is soft.      Tenderness: There is no abdominal tenderness.   Musculoskeletal:         General: Signs of injury (Deep laceration approximately 0.7 cm distal tip of left fifth digit.  No involvement of nail fold or nailbed) present. No swelling.      Cervical back: Neck supple.   Skin:     General: Skin is warm and dry.      Capillary Refill: Capillary refill takes less than 2 seconds.   Neurological:      Mental Status: She is alert.   Psychiatric:         Mood and Affect: Mood normal.          DIAGNOSTIC RESULTS     LABS:  Labs Reviewed - No data to display    All other labs were within normal range or not returned as of this dictation.    Imaging  No orders to display        Procedures  Laceration Repair    Performed by: Arlette Rose DO  Authorized by: Ludin Cheng DO    Consent:     Consent obtained:  Verbal    Consent given by:  Patient    Risks, benefits, and alternatives were discussed: yes      Risks discussed:  Infection, pain, poor cosmetic result and poor wound healing  Universal protocol:     Patient identity confirmed:  Verbally with patient and arm band  Anesthesia:     Anesthesia method:  Nerve block    Block location:  Digital    Block anesthetic:  Lidocaine 1% w/o epi    Block outcome:  Anesthesia achieved  Laceration details:     Location:  Finger    Finger location:  L small finger    Length (cm):  0.7    Depth (mm):  5  Exploration:     Hemostasis achieved with:   Tourniquet    Imaging outcome: foreign body not noted      Wound exploration: entire depth of wound visualized      Contaminated: no    Treatment:     Irrigation solution:  Sterile saline    Irrigation method:  Syringe    Debridement:  Minimal    Undermining:  None  Skin repair:     Repair method:  Sutures    Suture size:  3-0    Suture material:  Fast-absorbing gut    Suture technique:  Simple interrupted    Number of sutures:  3  Approximation:     Approximation:  Close  Repair type:     Repair type:  Simple  Post-procedure details:     Dressing:  Antibiotic ointment, splint for protection and tube gauze    Procedure completion:  Tolerated       EMERGENCY DEPARTMENT COURSE/MDM:   Yaneli Cain is a 44 y.o. female presenting to the ED for evaluation of had concerns including Finger Laceration (Cut finger while cutting box, knife went through the box and cut pt's pinky finger)..   Medical Decision Making  44-year-old female presenting after laceration to her left pinky finger.  Cleansed and irrigated at the bedside, and repaired under digital block.  Splinted for protection.    Discharged in stable condition with return precautions        Diagnoses as of 04/29/24 0043   Laceration of left little finger without foreign body without damage to nail, initial encounter          External records reviewed: I reviewed external records including outpatient, PCP records, and prior discharge summaries    I have reviewed this case with the ED attending physician, and the attending agrees with the plan. Patient or family was counselled regarding labs, imaging, likely diagnosis, and plan. All questions were answered.     Arlette Rose DO  PGY-4, emergency medicine    The above documentation was completed with the use of speech recognition software. It may contain dictation errors secondary to limitations of the software.      ED Medications administered this visit:    Medications   bacitracin ointment 1 Application (has no  administration in time range)   diphth,pertus(acell),tetanus (BoostRIX) 2.5-8-5 Lf-mcg-Lf/0.5mL vaccine 0.5 mL (has no administration in time range)   lidocaine PF (Xylocaine) 10 mg/mL (1 %) injection 50 mg (has no administration in time range)       New Prescriptions from this visit:    New Prescriptions    No medications on file       Final Impression: No diagnosis found.      (Please note that portions of this note were completed with a voice recognition program.  Efforts were made to edit the dictations but occasionally words are mis-transcribed.)     Arlette Rose, DO  Resident  04/29/24 0048

## 2024-04-28 NOTE — DISCHARGE INSTRUCTIONS
The sutures will dissolve within the next 5 days.  Please follow-up with your primary care provider, or return to the emergency department for suture removal.  In the meantime, you can keep it clean and dry with regular soap, just do not scrub on it.  Please also avoid swimming or soaking the area.  Please return to the emergency department immediately if you develop fever, significant swelling, or if the wound site becomes hard, hot, or develops a discharge that looks like pus.    All wounds of this type will form scars.  The 2 most common factors that promote worsening scar formation is sun exposure and smoking.  You can minimize scar formation by using protective clothing, wearing sunscreen or hat, and otherwise protecting from sun, and refraining from tobacco or marijuana use until the wound has healed.

## 2024-04-29 RX ORDER — DEXTROAMPHETAMINE SACCHARATE, AMPHETAMINE ASPARTATE MONOHYDRATE, DEXTROAMPHETAMINE SULFATE AND AMPHETAMINE SULFATE 5; 5; 5; 5 MG/1; MG/1; MG/1; MG/1
20 CAPSULE, EXTENDED RELEASE ORAL EVERY MORNING
Qty: 90 CAPSULE | Refills: 0 | Status: SHIPPED | OUTPATIENT
Start: 2024-04-29

## 2024-05-22 DIAGNOSIS — J01.90 ACUTE SINUSITIS, RECURRENCE NOT SPECIFIED, UNSPECIFIED LOCATION: Primary | ICD-10-CM

## 2024-05-22 RX ORDER — METHYLPREDNISOLONE 4 MG/1
TABLET ORAL
Qty: 21 TABLET | Refills: 0 | Status: SHIPPED | OUTPATIENT
Start: 2024-05-22 | End: 2024-05-29

## 2024-05-22 RX ORDER — DOXYCYCLINE 100 MG/1
100 CAPSULE ORAL 2 TIMES DAILY
Qty: 20 CAPSULE | Refills: 0 | Status: SHIPPED | OUTPATIENT
Start: 2024-05-22 | End: 2024-06-01

## 2024-07-19 ENCOUNTER — LAB (OUTPATIENT)
Dept: LAB | Facility: LAB | Age: 44
End: 2024-07-19
Payer: COMMERCIAL

## 2024-07-19 ENCOUNTER — APPOINTMENT (OUTPATIENT)
Dept: PRIMARY CARE | Facility: CLINIC | Age: 44
End: 2024-07-19
Payer: COMMERCIAL

## 2024-07-19 VITALS
DIASTOLIC BLOOD PRESSURE: 68 MMHG | RESPIRATION RATE: 16 BRPM | BODY MASS INDEX: 23.96 KG/M2 | HEART RATE: 65 BPM | SYSTOLIC BLOOD PRESSURE: 116 MMHG | TEMPERATURE: 98.4 F | OXYGEN SATURATION: 100 % | WEIGHT: 131 LBS

## 2024-07-19 DIAGNOSIS — Z12.4 CERVICAL CANCER SCREENING: ICD-10-CM

## 2024-07-19 DIAGNOSIS — F90.0 ATTENTION DEFICIT HYPERACTIVITY DISORDER (ADHD), PREDOMINANTLY INATTENTIVE TYPE: ICD-10-CM

## 2024-07-19 DIAGNOSIS — E03.9 HYPOTHYROIDISM, UNSPECIFIED TYPE: ICD-10-CM

## 2024-07-19 DIAGNOSIS — Z00.00 HEALTHCARE MAINTENANCE: ICD-10-CM

## 2024-07-19 DIAGNOSIS — Z00.00 HEALTHCARE MAINTENANCE: Primary | ICD-10-CM

## 2024-07-19 LAB
25(OH)D3 SERPL-MCNC: 57 NG/ML (ref 30–100)
ALBUMIN SERPL BCP-MCNC: 4.7 G/DL (ref 3.4–5)
ALP SERPL-CCNC: 66 U/L (ref 33–110)
ALT SERPL W P-5'-P-CCNC: 15 U/L (ref 7–45)
AMPHETAMINES UR QL SCN: ABNORMAL
ANION GAP SERPL CALC-SCNC: 12 MMOL/L (ref 10–20)
AST SERPL W P-5'-P-CCNC: 17 U/L (ref 9–39)
BARBITURATES UR QL SCN: ABNORMAL
BENZODIAZ UR QL SCN: ABNORMAL
BILIRUB SERPL-MCNC: 0.8 MG/DL (ref 0–1.2)
BUN SERPL-MCNC: 13 MG/DL (ref 6–23)
BZE UR QL SCN: ABNORMAL
CALCIUM SERPL-MCNC: 10 MG/DL (ref 8.6–10.3)
CANNABINOIDS UR QL SCN: ABNORMAL
CHLORIDE SERPL-SCNC: 100 MMOL/L (ref 98–107)
CHOLEST SERPL-MCNC: 156 MG/DL (ref 0–199)
CHOLESTEROL/HDL RATIO: 3.1
CO2 SERPL-SCNC: 30 MMOL/L (ref 21–32)
CREAT SERPL-MCNC: 1.02 MG/DL (ref 0.5–1.05)
EGFRCR SERPLBLD CKD-EPI 2021: 71 ML/MIN/1.73M*2
ERYTHROCYTE [DISTWIDTH] IN BLOOD BY AUTOMATED COUNT: 13.1 % (ref 11.5–14.5)
FENTANYL+NORFENTANYL UR QL SCN: ABNORMAL
GLUCOSE SERPL-MCNC: 76 MG/DL (ref 74–99)
HCT VFR BLD AUTO: 42.5 % (ref 36–46)
HDLC SERPL-MCNC: 50.6 MG/DL
HGB BLD-MCNC: 13.8 G/DL (ref 12–16)
LDLC SERPL CALC-MCNC: 87 MG/DL
MCH RBC QN AUTO: 30.7 PG (ref 26–34)
MCHC RBC AUTO-ENTMCNC: 32.5 G/DL (ref 32–36)
MCV RBC AUTO: 95 FL (ref 80–100)
METHADONE UR QL SCN: ABNORMAL
NON HDL CHOLESTEROL: 105 MG/DL (ref 0–149)
NRBC BLD-RTO: 0 /100 WBCS (ref 0–0)
OPIATES UR QL SCN: ABNORMAL
OXYCODONE+OXYMORPHONE UR QL SCN: ABNORMAL
PCP UR QL SCN: ABNORMAL
PLATELET # BLD AUTO: 516 X10*3/UL (ref 150–450)
POC APPEARANCE, URINE: CLEAR
POC BILIRUBIN, URINE: NEGATIVE
POC BLOOD, URINE: NEGATIVE
POC COLOR, URINE: YELLOW
POC GLUCOSE, URINE: NEGATIVE MG/DL
POC KETONES, URINE: NEGATIVE MG/DL
POC LEUKOCYTES, URINE: NEGATIVE
POC NITRITE,URINE: NEGATIVE
POC PH, URINE: 5 PH
POC PROTEIN, URINE: NEGATIVE MG/DL
POC SPECIFIC GRAVITY, URINE: 1.01
POC UROBILINOGEN, URINE: 0.2 EU/DL
POTASSIUM SERPL-SCNC: 4.7 MMOL/L (ref 3.5–5.3)
PROT SERPL-MCNC: 7.3 G/DL (ref 6.4–8.2)
RBC # BLD AUTO: 4.49 X10*6/UL (ref 4–5.2)
SODIUM SERPL-SCNC: 137 MMOL/L (ref 136–145)
T4 FREE SERPL-MCNC: 0.88 NG/DL (ref 0.61–1.12)
TRIGL SERPL-MCNC: 93 MG/DL (ref 0–149)
TSH SERPL-ACNC: 2.23 MIU/L (ref 0.44–3.98)
VLDL: 19 MG/DL (ref 0–40)
WBC # BLD AUTO: 12.2 X10*3/UL (ref 4.4–11.3)

## 2024-07-19 PROCEDURE — 36415 COLL VENOUS BLD VENIPUNCTURE: CPT

## 2024-07-19 PROCEDURE — 84439 ASSAY OF FREE THYROXINE: CPT

## 2024-07-19 PROCEDURE — 87624 HPV HI-RISK TYP POOLED RSLT: CPT

## 2024-07-19 PROCEDURE — 84443 ASSAY THYROID STIM HORMONE: CPT

## 2024-07-19 PROCEDURE — 99396 PREV VISIT EST AGE 40-64: CPT | Performed by: FAMILY MEDICINE

## 2024-07-19 PROCEDURE — 1036F TOBACCO NON-USER: CPT | Performed by: FAMILY MEDICINE

## 2024-07-19 PROCEDURE — 81002 URINALYSIS NONAUTO W/O SCOPE: CPT | Performed by: FAMILY MEDICINE

## 2024-07-19 PROCEDURE — 80307 DRUG TEST PRSMV CHEM ANLYZR: CPT

## 2024-07-19 PROCEDURE — 80061 LIPID PANEL: CPT

## 2024-07-19 PROCEDURE — 80053 COMPREHEN METABOLIC PANEL: CPT

## 2024-07-19 PROCEDURE — 82306 VITAMIN D 25 HYDROXY: CPT

## 2024-07-19 PROCEDURE — 80324 DRUG SCREEN AMPHETAMINES 1/2: CPT

## 2024-07-19 PROCEDURE — 85027 COMPLETE CBC AUTOMATED: CPT

## 2024-07-19 RX ORDER — DEXTROAMPHETAMINE SACCHARATE, AMPHETAMINE ASPARTATE MONOHYDRATE, DEXTROAMPHETAMINE SULFATE, AMPHETAMINE SULFATE 6.25; 6.25; 6.25; 6.25 MG/1; MG/1; MG/1; MG/1
25 CAPSULE, EXTENDED RELEASE ORAL EVERY MORNING
Qty: 90 CAPSULE | Refills: 0 | Status: SHIPPED | OUTPATIENT
Start: 2024-07-19 | End: 2024-10-17

## 2024-07-19 ASSESSMENT — PATIENT HEALTH QUESTIONNAIRE - PHQ9
2. FEELING DOWN, DEPRESSED OR HOPELESS: NOT AT ALL
1. LITTLE INTEREST OR PLEASURE IN DOING THINGS: NOT AT ALL
SUM OF ALL RESPONSES TO PHQ9 QUESTIONS 1 AND 2: 0

## 2024-07-19 NOTE — PATIENT INSTRUCTIONS
Today we performed your Annual Physical Exam.  Your preventative health care, labs and vaccinations have been reviewed and are up to date.       We will switch you to long acting adderall per your request   Today we followed up on your Thyroid medication - we will check your labs and adjust the medication accordingly.  Our goal will be to have a TSH between 2 and 3.  We will closely monitor your symptoms to determine the optimal dosing.     Follow up in 6 months for a medication check  Urine drug screen ordered    Follow up in 1 year for your Complete Physical Exam

## 2024-07-19 NOTE — PROGRESS NOTES
Subjective   Patient ID: Yaneli Cain is a 44 y.o. female who presents for Annual Exam.    Dentist and Eye Doctor appointments: UTD  Immunizations: UTD  Diet: healthy, balanced, gluten free  Exercise: walking 2 miles a day and pilates  Supplements: iron, mvi  Menstrual Cycles:mirena  Sexually Active:yes  Pregnancy History:  Cancer Screening:    Cervical: due    Breast:3/2024   Colon: N/A (mom with colitis)   Skin: wears sunscreen   DEXA: N/A          HPI     Review of Systems    Objective   /68   Pulse 65   Temp 36.9 °C (98.4 °F)   Resp 16   Wt 59.4 kg (131 lb)   SpO2 100%   BMI 23.96 kg/m²     Physical Exam  Constitutional:       General: She is not in acute distress.     Appearance: She is well-developed. She is not diaphoretic.   HENT:      Head: Normocephalic and atraumatic.      Right Ear: Tympanic membrane normal.      Left Ear: Tympanic membrane normal.      Nose: Nose normal.      Mouth/Throat:      Mouth: Mucous membranes are moist.   Eyes:      General: No scleral icterus.     Pupils: Pupils are equal, round, and reactive to light.   Neck:      Thyroid: No thyromegaly.      Vascular: No JVD.   Cardiovascular:      Rate and Rhythm: Normal rate and regular rhythm.      Heart sounds: Normal heart sounds. No murmur heard.     No friction rub. No gallop.   Pulmonary:      Effort: Pulmonary effort is normal. No respiratory distress.      Breath sounds: Normal breath sounds. No wheezing or rales.   Chest:      Chest wall: No tenderness.   Breasts:     Right: Normal.      Left: Normal.   Abdominal:      General: Bowel sounds are normal. There is no distension.      Palpations: Abdomen is soft. There is no mass.      Tenderness: There is no abdominal tenderness. There is no rebound.   Genitourinary:     General: Normal vulva.      Vagina: Normal.      Cervix: Normal.      Uterus: Normal.       Adnexa: Right adnexa normal and left adnexa normal.   Musculoskeletal:         General: Normal range of  motion.      Cervical back: Normal range of motion and neck supple.   Lymphadenopathy:      Cervical: No cervical adenopathy.   Skin:     General: Skin is warm and dry.   Neurological:      General: No focal deficit present.      Mental Status: She is alert and oriented to person, place, and time.      Deep Tendon Reflexes: Reflexes normal.   Psychiatric:         Mood and Affect: Mood normal.         Behavior: Behavior normal.         Assessment/Plan   Diagnoses and all orders for this visit:  Healthcare maintenance  -     POCT UA (nonautomated) manually resulted  -     CBC; Future  -     Comprehensive Metabolic Panel; Future  -     Lipid Panel; Future  -     Vitamin D 25 hydroxy; Future  Cervical cancer screening  -     THINPREP PAP TEST  Attention deficit hyperactivity disorder (ADHD), predominantly inattentive type  -     Drug Screen, Urine With Reflex to Confirmation; Future  -     Amphetamine Confirm, Urine; Future  -     amphetamine-dextroamphetamine XR (Mydayvis) 25 mg 24 hr capsule; Take 1 capsule (25 mg) by mouth once daily in the morning. Do not crush or chew.  Hypothyroidism, unspecified type  -     Thyroid Stimulating Hormone; Future  -     Thyroxine, Free; Future

## 2024-07-28 DIAGNOSIS — G25.81 RESTLESS LEG SYNDROME: ICD-10-CM

## 2024-07-29 RX ORDER — GABAPENTIN 100 MG/1
100 CAPSULE ORAL 3 TIMES DAILY
Qty: 90 CAPSULE | Refills: 0 | Status: SHIPPED | OUTPATIENT
Start: 2024-07-29

## 2024-08-01 LAB
CYTOLOGY CMNT CVX/VAG CYTO-IMP: NORMAL
HPV HR 12 DNA GENITAL QL NAA+PROBE: NEGATIVE
HPV HR GENOTYPES PNL CVX NAA+PROBE: NEGATIVE
HPV16 DNA SPEC QL NAA+PROBE: NEGATIVE
HPV18 DNA SPEC QL NAA+PROBE: NEGATIVE
LAB AP HPV GENOTYPE QUESTION: YES
LAB AP HPV HR: NORMAL
LABORATORY COMMENT REPORT: NORMAL
PATH REPORT.TOTAL CANCER: NORMAL

## 2024-09-24 DIAGNOSIS — G25.81 RESTLESS LEG SYNDROME: ICD-10-CM

## 2024-09-24 RX ORDER — GABAPENTIN 100 MG/1
100 CAPSULE ORAL 3 TIMES DAILY
Qty: 270 CAPSULE | Refills: 1 | Status: SHIPPED | OUTPATIENT
Start: 2024-09-24 | End: 2025-09-24

## 2024-09-27 ENCOUNTER — PATIENT MESSAGE (OUTPATIENT)
Dept: PRIMARY CARE | Facility: CLINIC | Age: 44
End: 2024-09-27
Payer: COMMERCIAL

## 2024-09-27 DIAGNOSIS — J01.90 ACUTE SINUSITIS, RECURRENCE NOT SPECIFIED, UNSPECIFIED LOCATION: Primary | ICD-10-CM

## 2024-09-27 RX ORDER — AMOXICILLIN AND CLAVULANATE POTASSIUM 875; 125 MG/1; MG/1
875 TABLET, FILM COATED ORAL 2 TIMES DAILY
Qty: 20 TABLET | Refills: 0 | Status: SHIPPED | OUTPATIENT
Start: 2024-09-27 | End: 2024-10-07

## 2024-09-27 RX ORDER — METHYLPREDNISOLONE 4 MG/1
TABLET ORAL
Qty: 21 TABLET | Refills: 0 | Status: SHIPPED | OUTPATIENT
Start: 2024-09-27 | End: 2024-10-04

## 2024-10-22 DIAGNOSIS — F90.0 ATTENTION DEFICIT HYPERACTIVITY DISORDER (ADHD), PREDOMINANTLY INATTENTIVE TYPE: ICD-10-CM

## 2024-10-22 DIAGNOSIS — F33.8 SEASONAL DEPRESSION (CMS-HCC): Primary | ICD-10-CM

## 2024-10-22 RX ORDER — DEXTROAMPHETAMINE SACCHARATE, AMPHETAMINE ASPARTATE MONOHYDRATE, DEXTROAMPHETAMINE SULFATE, AMPHETAMINE SULFATE 6.25; 6.25; 6.25; 6.25 MG/1; MG/1; MG/1; MG/1
25 CAPSULE, EXTENDED RELEASE ORAL EVERY MORNING
Qty: 30 CAPSULE | Refills: 0 | Status: SHIPPED | OUTPATIENT
Start: 2024-11-22 | End: 2024-12-22

## 2024-10-22 RX ORDER — DULOXETIN HYDROCHLORIDE 20 MG/1
40 CAPSULE, DELAYED RELEASE ORAL DAILY
Qty: 180 CAPSULE | Refills: 1 | Status: SHIPPED | OUTPATIENT
Start: 2024-10-22 | End: 2025-10-22

## 2024-10-22 RX ORDER — DEXTROAMPHETAMINE SACCHARATE, AMPHETAMINE ASPARTATE MONOHYDRATE, DEXTROAMPHETAMINE SULFATE, AMPHETAMINE SULFATE 6.25; 6.25; 6.25; 6.25 MG/1; MG/1; MG/1; MG/1
25 CAPSULE, EXTENDED RELEASE ORAL EVERY MORNING
Qty: 30 CAPSULE | Refills: 0 | Status: SHIPPED | OUTPATIENT
Start: 2024-10-22 | End: 2024-11-21

## 2024-10-22 RX ORDER — DEXTROAMPHETAMINE SACCHARATE, AMPHETAMINE ASPARTATE MONOHYDRATE, DEXTROAMPHETAMINE SULFATE, AMPHETAMINE SULFATE 6.25; 6.25; 6.25; 6.25 MG/1; MG/1; MG/1; MG/1
25 CAPSULE, EXTENDED RELEASE ORAL EVERY MORNING
Qty: 30 CAPSULE | Refills: 0 | Status: SHIPPED | OUTPATIENT
Start: 2024-12-23 | End: 2025-01-22

## 2024-10-22 NOTE — PROGRESS NOTES
Current Outpatient Medications   Medication Sig Dispense Refill    albuterol 90 mcg/actuation inhaler every 4 hours.      amphetamine-dextroamphetamine XR (Adderall XR) 20 mg 24 hr capsule Take 1 capsule (20 mg) by mouth once daily in the morning. 90 capsule 0    amphetamine-dextroamphetamine XR (Mydayvis) 25 mg 24 hr capsule Take 1 capsule (25 mg) by mouth once daily in the morning. Do not crush or chew. 90 capsule 0    azelastine (Astelin) 137 mcg (0.1 %) nasal spray ADMINISTER 1 SPRAY INTO EACH NOSTRIL 2 TIMES A DAY. USE IN EACH NOSTRIL AS DIRECTED 3 mL 3    fluticasone (Flonase Allergy Relief) 50 mcg/actuation nasal spray once every 24 hours.      gabapentin (Neurontin) 100 mg capsule Take 1 capsule (100 mg) by mouth 3 times a day. 270 capsule 1    iron 18 mg tablet Take by mouth.      levocetirizine (Xyzal) 5 mg tablet TAKE 1 TABLET (5 MG) BY MOUTH DAILY IN THE EVENING 90 tablet 3    levonorgestrel (Mirena) 21 mcg/24 hours (8 yrs) 52 mg IUD by intrauterine route.      levothyroxine (Synthroid, Levoxyl) 25 mcg tablet TAKE 1 TABLET (25 MCG) BY MOUTH DAILY-AS DIRECTED 90 tablet 1    magnesium oxide (Mag-Ox) 400 mg tablet 1 tablet (400 mg) once daily.      pantoprazole (ProtoNix) 20 mg EC tablet TAKE 1 TABLET BY MOUTH EVERY DAY 90 tablet 1     No current facility-administered medications for this visit.

## 2024-11-25 ENCOUNTER — APPOINTMENT (OUTPATIENT)
Dept: PRIMARY CARE | Facility: CLINIC | Age: 44
End: 2024-11-25
Payer: COMMERCIAL

## 2024-11-25 DIAGNOSIS — Z23 ENCOUNTER FOR IMMUNIZATION: ICD-10-CM

## 2024-11-25 PROCEDURE — 90656 IIV3 VACC NO PRSV 0.5 ML IM: CPT | Performed by: FAMILY MEDICINE

## 2024-11-25 PROCEDURE — 90471 IMMUNIZATION ADMIN: CPT | Performed by: FAMILY MEDICINE

## 2024-11-26 DIAGNOSIS — F90.0 ATTENTION DEFICIT HYPERACTIVITY DISORDER (ADHD), PREDOMINANTLY INATTENTIVE TYPE: Primary | ICD-10-CM

## 2024-11-26 RX ORDER — DEXTROAMPHETAMINE SACCHARATE, AMPHETAMINE ASPARTATE MONOHYDRATE, DEXTROAMPHETAMINE SULFATE, AMPHETAMINE SULFATE 6.25; 6.25; 6.25; 6.25 MG/1; MG/1; MG/1; MG/1
25 CAPSULE, EXTENDED RELEASE ORAL EVERY MORNING
Qty: 30 CAPSULE | Refills: 0 | Status: SHIPPED | OUTPATIENT
Start: 2024-12-30 | End: 2025-01-29

## 2024-11-26 RX ORDER — DEXTROAMPHETAMINE SACCHARATE, AMPHETAMINE ASPARTATE MONOHYDRATE, DEXTROAMPHETAMINE SULFATE, AMPHETAMINE SULFATE 6.25; 6.25; 6.25; 6.25 MG/1; MG/1; MG/1; MG/1
25 CAPSULE, EXTENDED RELEASE ORAL EVERY MORNING
Qty: 30 CAPSULE | Refills: 0 | Status: SHIPPED | OUTPATIENT
Start: 2024-11-29 | End: 2024-12-29

## 2024-11-26 NOTE — PROGRESS NOTES
Current Outpatient Medications   Medication Sig Dispense Refill    albuterol 90 mcg/actuation inhaler every 4 hours.      amphetamine-dextroamphetamine XR (Mydayvis) 25 mg 24 hr capsule Take 1 capsule (25 mg) by mouth once daily in the morning. Do not crush or chew. Do not fill before November 22, 2024. 30 capsule 0    azelastine (Astelin) 137 mcg (0.1 %) nasal spray ADMINISTER 1 SPRAY INTO EACH NOSTRIL 2 TIMES A DAY. USE IN EACH NOSTRIL AS DIRECTED 3 mL 3    DULoxetine (Cymbalta) 20 mg DR capsule Take 2 capsules (40 mg) by mouth once daily. Do not crush or chew. 180 capsule 1    fluticasone (Flonase Allergy Relief) 50 mcg/actuation nasal spray once every 24 hours.      gabapentin (Neurontin) 100 mg capsule Take 1 capsule (100 mg) by mouth 3 times a day. 270 capsule 1    iron 18 mg tablet Take by mouth.      levocetirizine (Xyzal) 5 mg tablet TAKE 1 TABLET (5 MG) BY MOUTH DAILY IN THE EVENING 90 tablet 3    levonorgestrel (Mirena) 21 mcg/24 hours (8 yrs) 52 mg IUD by intrauterine route.      levothyroxine (Synthroid, Levoxyl) 25 mcg tablet TAKE 1 TABLET (25 MCG) BY MOUTH DAILY-AS DIRECTED 90 tablet 1    magnesium oxide (Mag-Ox) 400 mg tablet 1 tablet (400 mg) once daily.      pantoprazole (ProtoNix) 20 mg EC tablet TAKE 1 TABLET BY MOUTH EVERY DAY 90 tablet 1     No current facility-administered medications for this visit.

## 2025-01-17 ENCOUNTER — APPOINTMENT (OUTPATIENT)
Dept: PRIMARY CARE | Facility: CLINIC | Age: 45
End: 2025-01-17
Payer: COMMERCIAL

## 2025-01-17 VITALS
SYSTOLIC BLOOD PRESSURE: 114 MMHG | HEART RATE: 83 BPM | RESPIRATION RATE: 16 BRPM | WEIGHT: 132 LBS | DIASTOLIC BLOOD PRESSURE: 72 MMHG | OXYGEN SATURATION: 98 % | BODY MASS INDEX: 24.14 KG/M2 | TEMPERATURE: 98.4 F

## 2025-01-17 DIAGNOSIS — J45.909 ASTHMA, UNSPECIFIED ASTHMA SEVERITY, UNSPECIFIED WHETHER COMPLICATED, UNSPECIFIED WHETHER PERSISTENT (HHS-HCC): ICD-10-CM

## 2025-01-17 DIAGNOSIS — F90.0 ATTENTION DEFICIT HYPERACTIVITY DISORDER (ADHD), PREDOMINANTLY INATTENTIVE TYPE: Primary | ICD-10-CM

## 2025-01-17 DIAGNOSIS — E03.9 HYPOTHYROIDISM, UNSPECIFIED TYPE: ICD-10-CM

## 2025-01-17 DIAGNOSIS — B37.31 YEAST VAGINITIS: ICD-10-CM

## 2025-01-17 PROCEDURE — 99214 OFFICE O/P EST MOD 30 MIN: CPT | Performed by: FAMILY MEDICINE

## 2025-01-17 RX ORDER — FLUCONAZOLE 150 MG/1
150 TABLET ORAL ONCE
Qty: 1 TABLET | Refills: 0 | Status: SHIPPED | OUTPATIENT
Start: 2025-01-17 | End: 2025-01-17

## 2025-01-17 RX ORDER — DEXTROAMPHETAMINE SACCHARATE, AMPHETAMINE ASPARTATE MONOHYDRATE, DEXTROAMPHETAMINE SULFATE, AMPHETAMINE SULFATE 6.25; 6.25; 6.25; 6.25 MG/1; MG/1; MG/1; MG/1
25 CAPSULE, EXTENDED RELEASE ORAL EVERY MORNING
Qty: 30 CAPSULE | Refills: 0 | Status: SHIPPED | OUTPATIENT
Start: 2025-02-17 | End: 2025-03-19

## 2025-01-17 RX ORDER — DEXTROAMPHETAMINE SACCHARATE, AMPHETAMINE ASPARTATE MONOHYDRATE, DEXTROAMPHETAMINE SULFATE, AMPHETAMINE SULFATE 6.25; 6.25; 6.25; 6.25 MG/1; MG/1; MG/1; MG/1
25 CAPSULE, EXTENDED RELEASE ORAL EVERY MORNING
Qty: 30 CAPSULE | Refills: 0 | Status: SHIPPED | OUTPATIENT
Start: 2025-01-17 | End: 2025-02-16

## 2025-01-17 RX ORDER — DEXTROAMPHETAMINE SACCHARATE, AMPHETAMINE ASPARTATE MONOHYDRATE, DEXTROAMPHETAMINE SULFATE, AMPHETAMINE SULFATE 6.25; 6.25; 6.25; 6.25 MG/1; MG/1; MG/1; MG/1
25 CAPSULE, EXTENDED RELEASE ORAL EVERY MORNING
Qty: 30 CAPSULE | Refills: 0 | Status: SHIPPED | OUTPATIENT
Start: 2025-03-20 | End: 2025-04-19

## 2025-01-17 NOTE — PROGRESS NOTES
Current Outpatient Medications   Medication Sig Dispense Refill    albuterol 90 mcg/actuation inhaler every 4 hours.      amphetamine-dextroamphetamine XR (Mydayis) 25 mg 24 hr capsule Take 1 capsule (25 mg) by mouth once daily in the morning. Do not crush or chew. Do not fill before December 30, 2024. 30 capsule 0    fluticasone (Flonase Allergy Relief) 50 mcg/actuation nasal spray once every 24 hours.      gabapentin (Neurontin) 100 mg capsule Take 1 capsule (100 mg) by mouth 3 times a day. 270 capsule 1    iron 18 mg tablet Take by mouth.      levocetirizine (Xyzal) 5 mg tablet TAKE 1 TABLET (5 MG) BY MOUTH DAILY IN THE EVENING 90 tablet 3    levonorgestrel (Mirena) 21 mcg/24 hours (8 yrs) 52 mg IUD by intrauterine route.      levothyroxine (Synthroid, Levoxyl) 25 mcg tablet TAKE 1 TABLET (25 MCG) BY MOUTH DAILY-AS DIRECTED 90 tablet 1    magnesium oxide (Mag-Ox) 400 mg tablet 1 tablet (400 mg) once daily.      pantoprazole (ProtoNix) 20 mg EC tablet TAKE 1 TABLET BY MOUTH EVERY DAY 90 tablet 1    amphetamine-dextroamphetamine XR (Mydayis) 25 mg 24 hr capsule Take 1 capsule (25 mg) by mouth once daily in the morning. Do not crush or chew. Do not fill before November 29, 2024. 30 capsule 0    amphetamine-dextroamphetamine XR (Mydayvis) 25 mg 24 hr capsule Take 1 capsule (25 mg) by mouth once daily in the morning. Do not crush or chew. Do not fill before November 22, 2024. 30 capsule 0    azelastine (Astelin) 137 mcg (0.1 %) nasal spray ADMINISTER 1 SPRAY INTO EACH NOSTRIL 2 TIMES A DAY. USE IN EACH NOSTRIL AS DIRECTED 3 mL 3    DULoxetine (Cymbalta) 20 mg DR capsule Take 2 capsules (40 mg) by mouth once daily. Do not crush or chew. (Patient not taking: Reported on 1/17/2025) 180 capsule 1     No current facility-administered medications for this visit.   Subjective   Patient ID: Yaneli Cain is a 44 y.o. female who presents for ADHD and Hypothyroidism.    Loves the Mydayis the medication lasts longer and she  loves it    She is due to have her thyroid meds rechecked    She stopped the cymbalta has been doing well without it she had decreased libido     She is pretty sure she has a yeast vaginal infection    ADHD         Review of Systems    Objective   /72   Pulse 83   Temp 36.9 °C (98.4 °F)   Resp 16   Wt 59.9 kg (132 lb)   SpO2 98%   BMI 24.14 kg/m²     Physical Exam  Constitutional:       Appearance: Normal appearance.   HENT:      Head: Normocephalic and atraumatic.      Mouth/Throat:      Mouth: Mucous membranes are moist.   Pulmonary:      Effort: Pulmonary effort is normal. No respiratory distress.   Skin:     Findings: No rash.   Neurological:      General: No focal deficit present.      Mental Status: She is alert and oriented to person, place, and time.   Psychiatric:         Mood and Affect: Mood normal.         Behavior: Behavior normal.         Thought Content: Thought content normal.         Judgment: Judgment normal.         Assessment/Plan   Diagnoses and all orders for this visit:  Attention deficit hyperactivity disorder (ADHD), predominantly inattentive type  -     amphetamine-dextroamphetamine XR (Mydayis) 25 mg 24 hr capsule; Take 1 capsule (25 mg) by mouth once daily in the morning. Do not crush or chew. Do not fill before March 20, 2025.  -     amphetamine-dextroamphetamine XR (Mydayis) 25 mg 24 hr capsule; Take 1 capsule (25 mg) by mouth once daily in the morning. Do not crush or chew. Do not fill before February 17, 2025.  -     amphetamine-dextroamphetamine XR (Mydayis) 25 mg 24 hr capsule; Take 1 capsule (25 mg) by mouth once daily in the morning. Do not crush or chew.  Hypothyroidism, unspecified type  -     Thyroxine, Free; Future  -     Thyroid Stimulating Hormone; Future  Yeast vaginitis  -     fluconazole (Diflucan) 150 mg tablet; Take 1 tablet (150 mg) by mouth 1 time for 1 dose.

## 2025-01-17 NOTE — PATIENT INSTRUCTIONS
Today we followed up on your ADHD medication.  You are doing well.      Follow up in 6 months to monitor your medication     Today we followed up on your Thyroid medication - we will check your labs and adjust the medication accordingly.  Our goal will be to have a TSH between 2 and 3.  We will closely monitor your symptoms to determine the optimal dosing.      I have treated you vaginal yeast infection after you were on abx a few weeks ago - Follow up if no improvement or if symptoms worsen.

## 2025-02-24 DIAGNOSIS — J30.2 SEASONAL ALLERGIES: ICD-10-CM

## 2025-02-24 DIAGNOSIS — F90.0 ATTENTION DEFICIT HYPERACTIVITY DISORDER (ADHD), PREDOMINANTLY INATTENTIVE TYPE: ICD-10-CM

## 2025-02-24 RX ORDER — DEXTROAMPHETAMINE SACCHARATE, AMPHETAMINE ASPARTATE MONOHYDRATE, DEXTROAMPHETAMINE SULFATE, AMPHETAMINE SULFATE 6.25; 6.25; 6.25; 6.25 MG/1; MG/1; MG/1; MG/1
25 CAPSULE, EXTENDED RELEASE ORAL EVERY MORNING
Qty: 30 CAPSULE | Refills: 0 | Status: SHIPPED | OUTPATIENT
Start: 2025-05-01 | End: 2025-05-31

## 2025-02-24 RX ORDER — DEXTROAMPHETAMINE SACCHARATE, AMPHETAMINE ASPARTATE MONOHYDRATE, DEXTROAMPHETAMINE SULFATE, AMPHETAMINE SULFATE 6.25; 6.25; 6.25; 6.25 MG/1; MG/1; MG/1; MG/1
25 CAPSULE, EXTENDED RELEASE ORAL EVERY MORNING
Qty: 30 CAPSULE | Refills: 0 | Status: SHIPPED | OUTPATIENT
Start: 2025-04-01 | End: 2025-05-01

## 2025-02-24 RX ORDER — DEXTROAMPHETAMINE SACCHARATE, AMPHETAMINE ASPARTATE MONOHYDRATE, DEXTROAMPHETAMINE SULFATE, AMPHETAMINE SULFATE 6.25; 6.25; 6.25; 6.25 MG/1; MG/1; MG/1; MG/1
25 CAPSULE, EXTENDED RELEASE ORAL EVERY MORNING
Qty: 30 CAPSULE | Refills: 0 | Status: SHIPPED | OUTPATIENT
Start: 2025-03-01 | End: 2025-03-31

## 2025-02-24 NOTE — PROGRESS NOTES
Current Outpatient Medications   Medication Sig Dispense Refill    albuterol 90 mcg/actuation inhaler every 4 hours.      [START ON 3/20/2025] amphetamine-dextroamphetamine XR (Mydayis) 25 mg 24 hr capsule Take 1 capsule (25 mg) by mouth once daily in the morning. Do not crush or chew. Do not fill before March 20, 2025. 30 capsule 0    amphetamine-dextroamphetamine XR (Mydayis) 25 mg 24 hr capsule Take 1 capsule (25 mg) by mouth once daily in the morning. Do not crush or chew. Do not fill before February 17, 2025. 30 capsule 0    amphetamine-dextroamphetamine XR (Mydayis) 25 mg 24 hr capsule Take 1 capsule (25 mg) by mouth once daily in the morning. Do not crush or chew. 30 capsule 0    azelastine (Astelin) 137 mcg (0.1 %) nasal spray ADMINISTER 1 SPRAY INTO EACH NOSTRIL 2 TIMES A DAY. USE IN EACH NOSTRIL AS DIRECTED 3 mL 3    fluticasone (Flonase Allergy Relief) 50 mcg/actuation nasal spray once every 24 hours.      gabapentin (Neurontin) 100 mg capsule Take 1 capsule (100 mg) by mouth 3 times a day. 270 capsule 1    iron 18 mg tablet Take by mouth.      levocetirizine (Xyzal) 5 mg tablet TAKE 1 TABLET (5 MG) BY MOUTH DAILY IN THE EVENING 90 tablet 3    levonorgestrel (Mirena) 21 mcg/24 hours (8 yrs) 52 mg IUD by intrauterine route.      levothyroxine (Synthroid, Levoxyl) 25 mcg tablet TAKE 1 TABLET (25 MCG) BY MOUTH DAILY-AS DIRECTED 90 tablet 1    magnesium oxide (Mag-Ox) 400 mg tablet 1 tablet (400 mg) once daily.      pantoprazole (ProtoNix) 20 mg EC tablet TAKE 1 TABLET BY MOUTH EVERY DAY 90 tablet 1     No current facility-administered medications for this visit.

## 2025-03-19 DIAGNOSIS — J30.2 SEASONAL ALLERGIES: ICD-10-CM

## 2025-03-19 DIAGNOSIS — K21.9 GASTROESOPHAGEAL REFLUX DISEASE WITHOUT ESOPHAGITIS: ICD-10-CM

## 2025-03-19 DIAGNOSIS — E03.9 HYPOTHYROIDISM, UNSPECIFIED TYPE: ICD-10-CM

## 2025-03-19 RX ORDER — PANTOPRAZOLE SODIUM 20 MG/1
20 TABLET, DELAYED RELEASE ORAL DAILY
Qty: 90 TABLET | Refills: 1 | Status: SHIPPED | OUTPATIENT
Start: 2025-03-19

## 2025-03-19 RX ORDER — LEVOCETIRIZINE DIHYDROCHLORIDE 5 MG/1
5 TABLET, FILM COATED ORAL EVERY EVENING
Qty: 90 TABLET | Refills: 1 | Status: SHIPPED | OUTPATIENT
Start: 2025-03-19

## 2025-03-19 RX ORDER — LEVOTHYROXINE SODIUM 25 UG/1
TABLET ORAL
Qty: 90 TABLET | Refills: 1 | Status: SHIPPED | OUTPATIENT
Start: 2025-03-19

## 2025-04-25 ENCOUNTER — HOSPITAL ENCOUNTER (OUTPATIENT)
Dept: RADIOLOGY | Facility: CLINIC | Age: 45
Discharge: HOME | End: 2025-04-25
Payer: COMMERCIAL

## 2025-04-25 DIAGNOSIS — Z12.31 SCREENING MAMMOGRAM FOR BREAST CANCER: ICD-10-CM

## 2025-04-25 PROCEDURE — 77063 BREAST TOMOSYNTHESIS BI: CPT

## 2025-04-28 ENCOUNTER — PATIENT MESSAGE (OUTPATIENT)
Facility: CLINIC | Age: 45
End: 2025-04-28
Payer: COMMERCIAL

## 2025-04-28 DIAGNOSIS — F90.0 ATTENTION DEFICIT HYPERACTIVITY DISORDER (ADHD), PREDOMINANTLY INATTENTIVE TYPE: ICD-10-CM

## 2025-04-28 RX ORDER — DEXTROAMPHETAMINE SACCHARATE, AMPHETAMINE ASPARTATE MONOHYDRATE, DEXTROAMPHETAMINE SULFATE, AMPHETAMINE SULFATE 6.25; 6.25; 6.25; 6.25 MG/1; MG/1; MG/1; MG/1
25 CAPSULE, EXTENDED RELEASE ORAL EVERY MORNING
Qty: 30 CAPSULE | Refills: 0 | Status: SHIPPED | OUTPATIENT
Start: 2025-06-29 | End: 2025-07-29

## 2025-04-28 RX ORDER — DEXTROAMPHETAMINE SACCHARATE, AMPHETAMINE ASPARTATE MONOHYDRATE, DEXTROAMPHETAMINE SULFATE, AMPHETAMINE SULFATE 6.25; 6.25; 6.25; 6.25 MG/1; MG/1; MG/1; MG/1
25 CAPSULE, EXTENDED RELEASE ORAL EVERY MORNING
Qty: 30 CAPSULE | Refills: 0 | Status: SHIPPED | OUTPATIENT
Start: 2025-04-28 | End: 2025-05-28

## 2025-04-28 RX ORDER — DEXTROAMPHETAMINE SACCHARATE, AMPHETAMINE ASPARTATE MONOHYDRATE, DEXTROAMPHETAMINE SULFATE, AMPHETAMINE SULFATE 6.25; 6.25; 6.25; 6.25 MG/1; MG/1; MG/1; MG/1
25 CAPSULE, EXTENDED RELEASE ORAL EVERY MORNING
Qty: 30 CAPSULE | Refills: 0 | Status: SHIPPED | OUTPATIENT
Start: 2025-05-29 | End: 2025-06-28

## 2025-05-12 ENCOUNTER — PATIENT MESSAGE (OUTPATIENT)
Facility: CLINIC | Age: 45
End: 2025-05-12
Payer: COMMERCIAL

## 2025-05-12 DIAGNOSIS — M25.50 ARTHRALGIA, UNSPECIFIED JOINT: Primary | ICD-10-CM

## 2025-05-14 LAB
T4 FREE SERPL-MCNC: 1.1 NG/DL (ref 0.8–1.8)
TSH SERPL-ACNC: 3.37 MIU/L

## 2025-05-15 LAB
ANA SER QL IF: NEGATIVE
BASOPHILS # BLD AUTO: 74 CELLS/UL (ref 0–200)
BASOPHILS NFR BLD AUTO: 0.9 %
CRP SERPL-MCNC: <3 MG/L
EOSINOPHIL # BLD AUTO: 180 CELLS/UL (ref 15–500)
EOSINOPHIL NFR BLD AUTO: 2.2 %
ERYTHROCYTE [DISTWIDTH] IN BLOOD BY AUTOMATED COUNT: 13 % (ref 11–15)
ERYTHROCYTE [SEDIMENTATION RATE] IN BLOOD BY WESTERGREN METHOD: 2 MM/H
GLIADIN IGA SER IA-ACNC: <1 U/ML
GLIADIN IGG SER IA-ACNC: <1 U/ML
HCT VFR BLD AUTO: 41.1 % (ref 35–45)
HGB BLD-MCNC: 13 G/DL (ref 11.7–15.5)
IGA SERPL-MCNC: 334 MG/DL (ref 47–310)
LYMPHOCYTES # BLD AUTO: 2558 CELLS/UL (ref 850–3900)
LYMPHOCYTES NFR BLD AUTO: 31.2 %
MCH RBC QN AUTO: 30.8 PG (ref 27–33)
MCHC RBC AUTO-ENTMCNC: 31.6 G/DL (ref 32–36)
MCV RBC AUTO: 97.4 FL (ref 80–100)
MONOCYTES # BLD AUTO: 402 CELLS/UL (ref 200–950)
MONOCYTES NFR BLD AUTO: 4.9 %
NEUTROPHILS # BLD AUTO: 4986 CELLS/UL (ref 1500–7800)
NEUTROPHILS NFR BLD AUTO: 60.8 %
PLATELET # BLD AUTO: 491 THOUSAND/UL (ref 140–400)
PMV BLD REES-ECKER: 10 FL (ref 7.5–12.5)
RBC # BLD AUTO: 4.22 MILLION/UL (ref 3.8–5.1)
RHEUMATOID FACT SERPL-ACNC: <10 IU/ML
TTG IGA SER-ACNC: <1 U/ML
TTG IGG SER-ACNC: <1 U/ML
WBC # BLD AUTO: 8.2 THOUSAND/UL (ref 3.8–10.8)

## 2025-05-16 ENCOUNTER — PATIENT MESSAGE (OUTPATIENT)
Facility: CLINIC | Age: 45
End: 2025-05-16

## 2025-05-16 ENCOUNTER — OFFICE VISIT (OUTPATIENT)
Facility: CLINIC | Age: 45
End: 2025-05-16
Payer: COMMERCIAL

## 2025-05-16 VITALS
RESPIRATION RATE: 16 BRPM | HEART RATE: 73 BPM | TEMPERATURE: 98.4 F | WEIGHT: 133 LBS | OXYGEN SATURATION: 97 % | BODY MASS INDEX: 24.33 KG/M2 | SYSTOLIC BLOOD PRESSURE: 112 MMHG | DIASTOLIC BLOOD PRESSURE: 62 MMHG

## 2025-05-16 DIAGNOSIS — K58.9 IRRITABLE BOWEL SYNDROME, UNSPECIFIED TYPE: ICD-10-CM

## 2025-05-16 DIAGNOSIS — K90.41 GLUTEN-SENSITIVE ENTEROPATHY: Primary | ICD-10-CM

## 2025-05-16 DIAGNOSIS — E03.9 HYPOTHYROIDISM, UNSPECIFIED TYPE: ICD-10-CM

## 2025-05-16 PROCEDURE — 99214 OFFICE O/P EST MOD 30 MIN: CPT | Performed by: FAMILY MEDICINE

## 2025-05-16 RX ORDER — LEVOTHYROXINE SODIUM 25 UG/1
25 TABLET ORAL DAILY
Qty: 90 TABLET | Refills: 1 | Status: SHIPPED | OUTPATIENT
Start: 2025-05-16 | End: 2026-05-16

## 2025-05-16 NOTE — PROGRESS NOTES
Subjective   Patient ID: Yaneli Cain is a 45 y.o. female who presents for Joint Pain.      She is very suspicious for crohn's or colitis she had a cscope about 10 years ago.  She has joint pain and fatigue.  She has abdominal pain that comes and goes as well.  She gets a burning and it's not always the same spot.  Her markers were all normal.  She eats 90% Gluten free but one of her labs were still highin the antibody.  She does occasionally have some stool urge incontinence and when she goes it's either mucous (this is at least once a week) and has to have diarrhea.  The fatigue has been her worst symptoms.  She takes a nap almost every day.  Over the winter she thought it was some seasonal depression.  Now the last few weeks she is so tired she feels like she can barely stand up.        Review of Systems    Current Outpatient Medications   Medication Instructions    albuterol 90 mcg/actuation inhaler Every 4 hours    amphetamine-dextroamphetamine XR (Mydayis) 25 mg 24 hr capsule 25 mg, oral, Every morning, Do not crush or chew.    [START ON 6/29/2025] amphetamine-dextroamphetamine XR (Mydayis) 25 mg 24 hr capsule 25 mg, oral, Every morning, Do not crush or chew.    [START ON 5/29/2025] amphetamine-dextroamphetamine XR (Mydayis) 25 mg 24 hr capsule 25 mg, oral, Every morning, Do not crush or chew.    amphetamine-dextroamphetamine XR (Mydayis) 25 mg 24 hr capsule 25 mg, oral, Every morning, Do not crush or chew.    azelastine (Astelin) 137 mcg (0.1 %) nasal spray 1 spray, Each Nostril, 2 times daily, Use in each nostril as directed    fluticasone (Flonase Allergy Relief) 50 mcg/actuation nasal spray Every 24 hours    gabapentin (NEURONTIN) 100 mg, oral, 3 times daily    iron 18 mg tablet Take by mouth.    levocetirizine (XYZAL) 5 mg, oral, Every evening    levonorgestrel (Mirena) 21 mcg/24 hours (8 yrs) 52 mg IUD by intrauterine route.    levothyroxine (Synthroid, Levoxyl) 25 mcg tablet TAKE 1 TABLET (25 MCG) BY  MOUTH DAILY-AS DIRECTED    magnesium oxide (MAG-OX) 400 mg, Daily    pantoprazole (PROTONIX) 20 mg, oral, Daily       RX Allergies[1]  Other, Bupropion, and Morphine    Past Medical History:  05/12/2023: ADHD  05/12/2023: GERD (gastroesophageal reflux disease)  05/12/2023: Hypothyroid  05/12/2023: Low iron stores  05/12/2023: Restless leg syndrome      Comment:  Treated with gabapentin    Social History     Tobacco Use    Smoking status: Never    Smokeless tobacco: Never   Substance Use Topics    Alcohol use: Yes     Comment: socially       Objective     Vitals:    05/16/25 1215   BP: 112/62   Pulse: 73   Resp: 16   Temp: 36.9 °C (98.4 °F)   SpO2: 97%   Weight: 60.3 kg (133 lb)     No LMP recorded. Patient is premenopausal.    Physical Exam  Constitutional:       Appearance: Normal appearance.   HENT:      Head: Normocephalic.   Cardiovascular:      Rate and Rhythm: Normal rate.      Pulses: Normal pulses.   Pulmonary:      Effort: Pulmonary effort is normal. No respiratory distress.   Skin:     General: Skin is warm and dry.      Capillary Refill: Capillary refill takes less than 2 seconds.      Findings: No rash.   Neurological:      General: No focal deficit present.      Mental Status: She is alert and oriented to person, place, and time.   Psychiatric:         Mood and Affect: Mood normal.         Behavior: Behavior normal.         Assessment/Plan   There are no diagnoses linked to this encounter.               [1]   Allergies  Allergen Reactions    Other Unknown     Environmental allergies.    Bupropion Hives, Rash and Unknown    Morphine Itching, Rash and Swelling        Allergies  Allergen Reactions    Other Unknown     Environmental allergies.    Bupropion Hives, Rash and Unknown    Morphine Itching, Rash and Swelling

## 2025-05-19 NOTE — PATIENT INSTRUCTIONS
Today we addressed your gluten sensitive enteropathy sympotms and ordered additional labs since one of your celiac panel antibodies was elevated.      I want you to see GI and have a new cscope done for biopsies as well since you are having flares that are debilitating    I will try to help expedite this process.      Follow up when results received.

## 2025-05-21 ENCOUNTER — TELEPHONE (OUTPATIENT)
Facility: CLINIC | Age: 45
End: 2025-05-21
Payer: COMMERCIAL

## 2025-05-27 ENCOUNTER — OFFICE VISIT (OUTPATIENT)
Dept: GASTROENTEROLOGY | Facility: CLINIC | Age: 45
End: 2025-05-27
Payer: COMMERCIAL

## 2025-05-27 DIAGNOSIS — Z83.79 FAMILY HISTORY OF ULCERATIVE COLITIS: ICD-10-CM

## 2025-05-27 DIAGNOSIS — K21.9 GASTROESOPHAGEAL REFLUX DISEASE, UNSPECIFIED WHETHER ESOPHAGITIS PRESENT: ICD-10-CM

## 2025-05-27 DIAGNOSIS — K52.9 CHRONIC DIARRHEA: Primary | ICD-10-CM

## 2025-05-27 DIAGNOSIS — I73.00 RAYNAUD'S DISEASE WITHOUT GANGRENE: ICD-10-CM

## 2025-05-27 PROCEDURE — 99215 OFFICE O/P EST HI 40 MIN: CPT | Mod: 95 | Performed by: NURSE PRACTITIONER

## 2025-05-27 PROCEDURE — 99205 OFFICE O/P NEW HI 60 MIN: CPT | Performed by: NURSE PRACTITIONER

## 2025-05-27 ASSESSMENT — ENCOUNTER SYMPTOMS
APNEA: 0
CHEST TIGHTNESS: 0
ENDOCRINE NEGATIVE: 1
FEVER: 0
WHEEZING: 0
HEMATOLOGIC/LYMPHATIC NEGATIVE: 1
EYES NEGATIVE: 1
RESPIRATORY NEGATIVE: 1
PSYCHIATRIC NEGATIVE: 1
MUSCULOSKELETAL NEGATIVE: 1
FATIGUE: 0
CHILLS: 0
DIFFICULTY URINATING: 0
COUGH: 0
SHORTNESS OF BREATH: 0
DIAPHORESIS: 0
ROS GI COMMENTS: SEE HPI
STRIDOR: 0
CARDIOVASCULAR NEGATIVE: 1
ALLERGIC/IMMUNOLOGIC NEGATIVE: 1
NEUROLOGICAL NEGATIVE: 1

## 2025-05-27 NOTE — PATIENT INSTRUCTIONS
Complete EGD and colonoscopy    You will be scheduled for a colonoscopy and EGD  Please read all of the instructions 7 days before your colonoscopy.    You will need to take ONLY clear liquids the ENTIRE day before your procedure. These include (clear fruit juices, soda, Gatorade, broth, jello and coffee/tea) Avoid red and purple drinks. No cream or milk in the coffee.  You will need to take a bowel preparation.  You will also need a .      To schedule a procedure please call [phone]      Start benefiber one teaspoon daily, with a full glass of water and try for 2 weeks  And consider a gluten free diet x 2 weeks followed by a lactose free diet x 2 weeks     Consider trying IBgard

## 2025-05-27 NOTE — PROGRESS NOTES
Subjective   Patient ID: Yaneli Cain is a 45 y.o. female who presents for GI issues. PMH: hypothyroidism, GERD     She has a family hx of UC (mother). She has had stomach problems since she was 22. She has been on Protonix since she was 20-23. She had dyspepsia, bloating after eating and nausea. The Protonix helped her symptoms, she had an EGD at 26 at [facility]. She reports the EGD was normal.     She started working at [employer] in [id number] she reports she is having urgency, not getting to the bathroom on time, having a hard time emptying. She was also having episodes of constipation. She reports burning pain throughout the abdomen-intermittent and not related to eating or defecating.   She underwent a colonoscopy and EGD at this time.   She reports she had fundic gland polyps and the colonoscopy was normal. Pathology was reportedly unremarkable. She was told she had IBS at this time.     A few years later, she was eating protein bars and cherry coke zero. She recently changed her diet. She is having fatigue. She then went gluten free and this sort of helped. However symptoms never really went away.     Over the last few months symptoms are worse, she is taking naps during the day. CBC was WNL.     She has diarrhea 3-4 times/day for 1-3 days followed by constipation. She denies rectal bleeding-scant amount on the toilet paper. She reports fluctuation in the her weights, she has rare night sweats but denies fevers. She denies new skin rashes, +joint pain    Has tried senna, mag citrate, and Miralax       Had a celiac panel which was unremarkable except for an elevated IGA --she was on a gluten free diet    Family Hx: Mother has UC   M. Aunt with rectal cancer @ 50s  No h/o Tran's esophagus, esophageal cancer    Review of Systems   Constitutional:  Negative for chills, diaphoresis, fatigue and fever.   HENT: Negative.     Eyes: Negative.    Respiratory: Negative.  Negative for apnea, cough, chest tightness, shortness of  breath, wheezing and stridor.    Cardiovascular: Negative.    Gastrointestinal:         See HPI    Endocrine: Negative.    Genitourinary: Negative.  Negative for difficulty urinating.   Musculoskeletal: Negative.    Skin: Negative.    Allergic/Immunologic: Negative.    Neurological: Negative.    Hematological: Negative.    Psychiatric/Behavioral: Negative.         Objective   Physical Exam  Neurological:      Mental Status: She is alert.   Psychiatric:         Mood and Affect: Mood normal.         Assessment/Plan   Diagnoses and all orders for this visit:  Chronic diarrhea  -     Calprotectin, Fecal; Future  -     Vitamin B12; Future  -     Vitamin D 25-Hydroxy,Total (for eval of Vitamin D levels); Future  -     Colonoscopy Screening; Average Risk Patient; Future  Gastroesophageal reflux disease, unspecified whether esophagitis present  -     Esophagogastroduodenoscopy (EGD); Future     45 year old female with a PMH of GERD, IBS who requests a virtual visit today for alternating diarrhea, constipation.She was previously seen by GI at [facility] for similar symptoms in 2015 and underwent a colonoscopy and EGD which was reportedly normal except for fundic gland polyps.     She has been following a gluten free diet and recently had a celiac panel, we discussed an elevated total IgA is generally considered an incidental finding and is not associated with celiac disease or the diagnosis of celiac disease. We also reviewed results could be inaccurate d/t gluten free diet.     Will check fecal calprotectin, colonoscopy d/t family hx of UC and EGD d/t long h/o GERD. Consider a lactose free diet, HBT to r/o SIBO. Will give a trial of benefiber and Ibgard.     Follow-up after procedures     CLAUDIA Chase 05/27/25 11:34 AM

## 2025-05-27 NOTE — H&P (VIEW-ONLY)
Subjective   Patient ID: Yaneli Cain is a 45 y.o. female who presents for GI issues. PMH: hypothyroidism, GERD     She has a family hx of UC (mother). She has had stomach problems since she was 22. She has been on Protonix since she was 20-23. She had dyspepsia, bloating after eating and nausea. The Protonix helped her symptoms, she had an EGD at 26 at [facility]. She reports the EGD was normal.     She started working at [employer] in [id number] she reports she is having urgency, not getting to the bathroom on time, having a hard time emptying. She was also having episodes of constipation. She reports burning pain throughout the abdomen-intermittent and not related to eating or defecating.   She underwent a colonoscopy and EGD at this time.   She reports she had fundic gland polyps and the colonoscopy was normal. Pathology was reportedly unremarkable. She was told she had IBS at this time.     A few years later, she was eating protein bars and cherry coke zero. She recently changed her diet. She is having fatigue. She then went gluten free and this sort of helped. However symptoms never really went away.     Over the last few months symptoms are worse, she is taking naps during the day. CBC was WNL.     She has diarrhea 3-4 times/day for 1-3 days followed by constipation. She denies rectal bleeding-scant amount on the toilet paper. She reports fluctuation in the her weights, she has rare night sweats but denies fevers. She denies new skin rashes, +joint pain    Has tried senna, mag citrate, and Miralax       Had a celiac panel which was unremarkable except for an elevated IGA --she was on a gluten free diet    Family Hx: Mother has UC   M. Aunt with rectal cancer @ 50s  No h/o Tran's esophagus, esophageal cancer    Review of Systems   Constitutional:  Negative for chills, diaphoresis, fatigue and fever.   HENT: Negative.     Eyes: Negative.    Respiratory: Negative.  Negative for apnea, cough, chest tightness, shortness of  breath, wheezing and stridor.    Cardiovascular: Negative.    Gastrointestinal:         See HPI    Endocrine: Negative.    Genitourinary: Negative.  Negative for difficulty urinating.   Musculoskeletal: Negative.    Skin: Negative.    Allergic/Immunologic: Negative.    Neurological: Negative.    Hematological: Negative.    Psychiatric/Behavioral: Negative.         Objective   Physical Exam  Neurological:      Mental Status: She is alert.   Psychiatric:         Mood and Affect: Mood normal.         Assessment/Plan   Diagnoses and all orders for this visit:  Chronic diarrhea  -     Calprotectin, Fecal; Future  -     Vitamin B12; Future  -     Vitamin D 25-Hydroxy,Total (for eval of Vitamin D levels); Future  -     Colonoscopy Screening; Average Risk Patient; Future  Gastroesophageal reflux disease, unspecified whether esophagitis present  -     Esophagogastroduodenoscopy (EGD); Future     45 year old female with a PMH of GERD, IBS who requests a virtual visit today for alternating diarrhea, constipation.She was previously seen by GI at [facility] for similar symptoms in 2015 and underwent a colonoscopy and EGD which was reportedly normal except for fundic gland polyps.     She has been following a gluten free diet and recently had a celiac panel, we discussed an elevated total IgA is generally considered an incidental finding and is not associated with celiac disease or the diagnosis of celiac disease. We also reviewed results could be inaccurate d/t gluten free diet.     Will check fecal calprotectin, colonoscopy d/t family hx of UC and EGD d/t long h/o GERD. Consider a lactose free diet, HBT to r/o SIBO. Will give a trial of benefiber and Ibgard.     Follow-up after procedures     CLAUDIA Chase 05/27/25 11:34 AM

## 2025-05-30 ENCOUNTER — PATIENT MESSAGE (OUTPATIENT)
Facility: CLINIC | Age: 45
End: 2025-05-30
Payer: COMMERCIAL

## 2025-05-30 DIAGNOSIS — F90.0 ATTENTION DEFICIT HYPERACTIVITY DISORDER (ADHD), PREDOMINANTLY INATTENTIVE TYPE: Primary | ICD-10-CM

## 2025-05-30 RX ORDER — DEXTROAMPHETAMINE SACCHARATE, AMPHETAMINE ASPARTATE, DEXTROAMPHETAMINE SULFATE AND AMPHETAMINE SULFATE 2.5; 2.5; 2.5; 2.5 MG/1; MG/1; MG/1; MG/1
10 TABLET ORAL 2 TIMES DAILY
Qty: 14 TABLET | Refills: 0 | Status: SHIPPED | OUTPATIENT
Start: 2025-05-30 | End: 2025-06-06

## 2025-06-01 LAB
25(OH)D3+25(OH)D2 SERPL-MCNC: 43 NG/ML (ref 30–100)
VIT B12 SERPL-MCNC: 576 PG/ML (ref 200–1100)

## 2025-06-03 DIAGNOSIS — Z12.11 COLON CANCER SCREENING: ICD-10-CM

## 2025-06-03 RX ORDER — POLYETHYLENE GLYCOL 3350, SODIUM SULFATE ANHYDROUS, SODIUM BICARBONATE, SODIUM CHLORIDE, POTASSIUM CHLORIDE 236; 22.74; 6.74; 5.86; 2.97 G/4L; G/4L; G/4L; G/4L; G/4L
4 POWDER, FOR SOLUTION ORAL ONCE
Qty: 4000 ML | Refills: 0 | Status: SHIPPED | OUTPATIENT
Start: 2025-06-03 | End: 2025-06-05

## 2025-06-05 ENCOUNTER — ANESTHESIA (OUTPATIENT)
Dept: GASTROENTEROLOGY | Facility: HOSPITAL | Age: 45
End: 2025-06-05
Payer: COMMERCIAL

## 2025-06-05 ENCOUNTER — HOSPITAL ENCOUNTER (OUTPATIENT)
Dept: GASTROENTEROLOGY | Facility: HOSPITAL | Age: 45
Discharge: HOME | End: 2025-06-05
Payer: COMMERCIAL

## 2025-06-05 ENCOUNTER — ANESTHESIA EVENT (OUTPATIENT)
Dept: GASTROENTEROLOGY | Facility: HOSPITAL | Age: 45
End: 2025-06-05
Payer: COMMERCIAL

## 2025-06-05 VITALS
DIASTOLIC BLOOD PRESSURE: 57 MMHG | HEART RATE: 70 BPM | TEMPERATURE: 97.7 F | RESPIRATION RATE: 14 BRPM | HEIGHT: 62 IN | OXYGEN SATURATION: 96 % | WEIGHT: 130 LBS | SYSTOLIC BLOOD PRESSURE: 96 MMHG | BODY MASS INDEX: 23.92 KG/M2

## 2025-06-05 DIAGNOSIS — K21.9 GASTROESOPHAGEAL REFLUX DISEASE, UNSPECIFIED WHETHER ESOPHAGITIS PRESENT: Primary | ICD-10-CM

## 2025-06-05 DIAGNOSIS — K52.9 CHRONIC DIARRHEA: ICD-10-CM

## 2025-06-05 DIAGNOSIS — Z83.79 FAMILY HISTORY OF ULCERATIVE COLITIS: ICD-10-CM

## 2025-06-05 LAB — HCG UR QL IA.RAPID: NEGATIVE

## 2025-06-05 PROCEDURE — 7100000010 HC PHASE TWO TIME - EACH INCREMENTAL 1 MINUTE

## 2025-06-05 PROCEDURE — 81025 URINE PREGNANCY TEST: CPT | Performed by: INTERNAL MEDICINE

## 2025-06-05 PROCEDURE — 7100000009 HC PHASE TWO TIME - INITIAL BASE CHARGE

## 2025-06-05 PROCEDURE — A45380 PR COLONOSCOPY,BIOPSY: Performed by: ANESTHESIOLOGIST ASSISTANT

## 2025-06-05 PROCEDURE — A45380 PR COLONOSCOPY,BIOPSY: Performed by: ANESTHESIOLOGY

## 2025-06-05 PROCEDURE — 45380 COLONOSCOPY AND BIOPSY: CPT | Performed by: INTERNAL MEDICINE

## 2025-06-05 PROCEDURE — 3700000001 HC GENERAL ANESTHESIA TIME - INITIAL BASE CHARGE

## 2025-06-05 PROCEDURE — 3700000002 HC GENERAL ANESTHESIA TIME - EACH INCREMENTAL 1 MINUTE

## 2025-06-05 PROCEDURE — 2720000007 HC OR 272 NO HCPCS

## 2025-06-05 PROCEDURE — 43239 EGD BIOPSY SINGLE/MULTIPLE: CPT | Performed by: INTERNAL MEDICINE

## 2025-06-05 PROCEDURE — 2500000004 HC RX 250 GENERAL PHARMACY W/ HCPCS (ALT 636 FOR OP/ED): Performed by: ANESTHESIOLOGIST ASSISTANT

## 2025-06-05 PROCEDURE — 43251 EGD REMOVE LESION SNARE: CPT | Performed by: INTERNAL MEDICINE

## 2025-06-05 RX ORDER — SODIUM CHLORIDE, SODIUM LACTATE, POTASSIUM CHLORIDE, CALCIUM CHLORIDE 600; 310; 30; 20 MG/100ML; MG/100ML; MG/100ML; MG/100ML
100 INJECTION, SOLUTION INTRAVENOUS CONTINUOUS
OUTPATIENT
Start: 2025-06-05 | End: 2025-06-06

## 2025-06-05 RX ORDER — FENTANYL CITRATE 50 UG/ML
INJECTION, SOLUTION INTRAMUSCULAR; INTRAVENOUS AS NEEDED
Status: DISCONTINUED | OUTPATIENT
Start: 2025-06-05 | End: 2025-06-05

## 2025-06-05 RX ORDER — LIDOCAINE HYDROCHLORIDE 10 MG/ML
0.1 INJECTION, SOLUTION INFILTRATION; PERINEURAL ONCE
OUTPATIENT
Start: 2025-06-05 | End: 2025-06-05

## 2025-06-05 RX ORDER — ALBUTEROL SULFATE 0.83 MG/ML
2.5 SOLUTION RESPIRATORY (INHALATION) ONCE AS NEEDED
OUTPATIENT
Start: 2025-06-05

## 2025-06-05 RX ORDER — ACETAMINOPHEN 325 MG/1
975 TABLET ORAL ONCE
OUTPATIENT
Start: 2025-06-05 | End: 2025-06-05

## 2025-06-05 RX ORDER — MIDAZOLAM HYDROCHLORIDE 1 MG/ML
INJECTION, SOLUTION INTRAMUSCULAR; INTRAVENOUS AS NEEDED
Status: DISCONTINUED | OUTPATIENT
Start: 2025-06-05 | End: 2025-06-05

## 2025-06-05 RX ORDER — PROPOFOL 10 MG/ML
INJECTION, EMULSION INTRAVENOUS CONTINUOUS PRN
Status: DISCONTINUED | OUTPATIENT
Start: 2025-06-05 | End: 2025-06-05

## 2025-06-05 RX ORDER — ONDANSETRON HYDROCHLORIDE 2 MG/ML
4 INJECTION, SOLUTION INTRAVENOUS ONCE AS NEEDED
OUTPATIENT
Start: 2025-06-05

## 2025-06-05 RX ORDER — DIPHENHYDRAMINE HYDROCHLORIDE 50 MG/ML
12.5 INJECTION, SOLUTION INTRAMUSCULAR; INTRAVENOUS ONCE AS NEEDED
OUTPATIENT
Start: 2025-06-05

## 2025-06-05 RX ORDER — MIDAZOLAM HYDROCHLORIDE 1 MG/ML
1 INJECTION, SOLUTION INTRAMUSCULAR; INTRAVENOUS ONCE AS NEEDED
OUTPATIENT
Start: 2025-06-05

## 2025-06-05 RX ORDER — HYDRALAZINE HYDROCHLORIDE 20 MG/ML
5 INJECTION INTRAMUSCULAR; INTRAVENOUS EVERY 30 MIN PRN
OUTPATIENT
Start: 2025-06-05

## 2025-06-05 RX ADMIN — FENTANYL CITRATE 25 MCG: 50 INJECTION, SOLUTION INTRAMUSCULAR; INTRAVENOUS at 15:33

## 2025-06-05 RX ADMIN — PROPOFOL 50 MG: 10 INJECTION, EMULSION INTRAVENOUS at 15:22

## 2025-06-05 RX ADMIN — PROPOFOL 20 MG: 10 INJECTION, EMULSION INTRAVENOUS at 15:25

## 2025-06-05 RX ADMIN — PROPOFOL 20 MG: 10 INJECTION, EMULSION INTRAVENOUS at 15:30

## 2025-06-05 RX ADMIN — MIDAZOLAM 2 MG: 1 INJECTION INTRAMUSCULAR; INTRAVENOUS at 15:21

## 2025-06-05 RX ADMIN — PROPOFOL 20 MG: 10 INJECTION, EMULSION INTRAVENOUS at 15:23

## 2025-06-05 RX ADMIN — PROPOFOL 20 MG: 10 INJECTION, EMULSION INTRAVENOUS at 15:33

## 2025-06-05 RX ADMIN — PROPOFOL 200 MCG/KG/MIN: 10 INJECTION, EMULSION INTRAVENOUS at 15:21

## 2025-06-05 ASSESSMENT — PAIN - FUNCTIONAL ASSESSMENT
PAIN_FUNCTIONAL_ASSESSMENT: 0-10
PAIN_FUNCTIONAL_ASSESSMENT: 0-10

## 2025-06-05 ASSESSMENT — PAIN SCALES - GENERAL
PAINLEVEL_OUTOF10: 0 - NO PAIN
PAINLEVEL_OUTOF10: 0 - NO PAIN
PAIN_LEVEL: 0

## 2025-06-05 ASSESSMENT — COLUMBIA-SUICIDE SEVERITY RATING SCALE - C-SSRS
2. HAVE YOU ACTUALLY HAD ANY THOUGHTS OF KILLING YOURSELF?: NO
6. HAVE YOU EVER DONE ANYTHING, STARTED TO DO ANYTHING, OR PREPARED TO DO ANYTHING TO END YOUR LIFE?: NO
1. IN THE PAST MONTH, HAVE YOU WISHED YOU WERE DEAD OR WISHED YOU COULD GO TO SLEEP AND NOT WAKE UP?: NO

## 2025-06-05 NOTE — Clinical Note
Pre-op: COLON CA SCREENING; EPIGASTRIC PAIN  Post-op: BX DUODENUM R/O CELIAC; MANY GASTRIC POLYPS - REMOVED MANY;

## 2025-06-05 NOTE — ANESTHESIA POSTPROCEDURE EVALUATION
Patient: Yaneli Cain    Procedure Summary       Date: 06/05/25 Room / Location: [facility] [place]    Anesthesia Start: 1518 Anesthesia Stop: 1610    Procedures:       EGD      COLONOSCOPY Diagnosis:       Gastroesophageal reflux disease, unspecified whether esophagitis present      Chronic diarrhea      Family history of ulcerative colitis    Scheduled Providers: Sheba Bright MD Responsible Provider: Frederick Henderson DO    Anesthesia Type: MAC ASA Status: 2            Anesthesia Type: MAC    Vitals Value Taken Time   BP 96/57 06/05/25 16:09   Temp 36.3 °C (97.3 °F) 06/05/25 16:09   Pulse 66 06/05/25 16:09   Resp 15 06/05/25 16:09   SpO2 100 % 06/05/25 16:09       Anesthesia Post Evaluation    Patient location during evaluation: PACU  Patient participation: complete - patient participated  Level of consciousness: sleepy but conscious  Pain score: 0  Pain management: adequate  Airway patency: patent  Cardiovascular status: acceptable  Respiratory status: acceptable  Hydration status: acceptable  Postoperative Nausea and Vomiting: none        No notable events documented.

## 2025-06-05 NOTE — DISCHARGE INSTRUCTIONS

## 2025-06-05 NOTE — ANESTHESIA PREPROCEDURE EVALUATION
Patient: Yaneli Cain    Procedure Information       Date/Time: 06/05/25 1430    Scheduled providers: Sheba Bright MD    Procedures:       EGD      COLONOSCOPY    Location: [facility]            Relevant Problems   Pulmonary   (+) Asthma, unspecified asthma severity, unspecified whether complicated, unspecified whether persistent (HHS-HCC)      GI   (+) GERD (gastroesophageal reflux disease)   (+) IBS (irritable bowel syndrome)      Endocrine   (+) Hypothyroid      HEENT   (+) Recurrent sinus infections   (+) Seasonal allergies      ID   (+) Yeast vaginitis       Clinical information reviewed:    Allergies                NPO Detail:  No data recorded     Physical Exam    Airway  Mallampati: II  TM distance: >3 FB  Neck ROM: full  Mouth opening: 3 or more finger widths     Cardiovascular - normal exam   Dental    Pulmonary - normal exam   Abdominal - normal exam           Anesthesia Plan    History of general anesthesia?: yes  History of complications of general anesthesia?: no    ASA 2     MAC     intravenous induction   Anesthetic plan and risks discussed with patient.    Plan discussed with CRNA.

## 2025-06-10 ENCOUNTER — PATIENT MESSAGE (OUTPATIENT)
Facility: CLINIC | Age: 45
End: 2025-06-10
Payer: COMMERCIAL

## 2025-06-10 LAB — CALPROTECTIN STL-MCNT: 13 MCG/G

## 2025-06-11 LAB — LACTOFERRIN STL QL IA: NEGATIVE

## 2025-06-18 LAB
LABORATORY COMMENT REPORT: NORMAL
PATH REPORT.FINAL DX SPEC: NORMAL
PATH REPORT.GROSS SPEC: NORMAL
PATH REPORT.RELEVANT HX SPEC: NORMAL
PATH REPORT.TOTAL CANCER: NORMAL

## 2025-06-19 ENCOUNTER — APPOINTMENT (OUTPATIENT)
Dept: GASTROENTEROLOGY | Facility: HOSPITAL | Age: 45
End: 2025-06-19
Payer: COMMERCIAL

## 2025-06-19 ENCOUNTER — APPOINTMENT (OUTPATIENT)
Dept: GASTROENTEROLOGY | Facility: CLINIC | Age: 45
End: 2025-06-19
Payer: COMMERCIAL

## 2025-07-13 NOTE — PROGRESS NOTES
Subjective   Patient ID: [id number]   Yaneli Cain is a 45 y.o. female with seasonal allergies, hypothyroidism, GERD, IBS, gluten sensitive enteropathy, interstitial nephritis, ADHD, RLS, asthma, iron deficiency, who presents for RD? referred by GI Ms. Angel     Current rheum meds:  - None     Previous rheum meds:  - None       HPI  RD?  Joint pains, fatigue  Feet turn white and purple when it is cold, with numbness, started after pregnancies  Joint pains in her knees, has chondromalacia, since her 20s  Wrists were bothering her during pregnancy, CTS diagnosed bilaterally, did steroid injections and that helped   Wrist pains sometimes, with activity   Hip pains and stiffness, with activity, going up the stairs and getting up from a seated position  Reports swelling in her hands, not specific to a joint  MS of a few minutes     + fatigue, naps an hour every day,   + hypersomnia  + IBS  + on and off diarrhea  + on and off constipation   + ADHD, on Adderal   + seasonal depression, mild  + anxiety, moderate, started on duloxetine  + stress is way down but kids are stressful  + migraines  + recurrent sinus infections  + brain fog  + Hand changes     PSH: No MSK surgeries   Allergies: NKDA   Habits: No smoking, 3-4 drinks a week of alcohol, no recreational drugs   Social hx: , 2 daughters, works in psychiatry, from home     ROS:  Constitutional: Denies fever, chills, weight loss, night sweats  Eyes: Denies dry eyes, blurry vision, redness or pain  ENT: Denies dry mouth, dental loss, loss of taste, nasal or oral ulcers, jaw claudication, difficulty swallowing, nasal crusting  Cardiovascular: Denies chest pain, palpitations, orthopnea  Respiratory: Denies shortness of breath, cough  Gastrointestinal: Denies dysphagia, nausea, vomiting, heartburn, abdominal pain, melena or hematochezia  Genitourinary: No recurrent urinary infections or STDs, no genital or anal ulcers  Integumentary: Denies photosensitivity, skin  tightening, digital ulcers, psoriatic lesions, or alopecia  Neurological: Denies any numbness or tingling, muscle weakness, or incontinence   Hematologic/Lymphatic: Denies bleeding, bruising, history of clots (arterial or venous), or abortions/miscarriages/pregnancy complications   MSK: No inflammatory back pain, enthesitis, dactylitis  Muscular: Denies weakness, difficulty rising from chair or combing the hair, muscle aches  FHx: Paternal aunt with hypothyroidism, Mother with UC    Problem List[1]     Medical History[2]     Surgical History[3]     Social History     Socioeconomic History    Marital status:      Spouse name: Not on file    Number of children: Not on file    Years of education: Not on file    Highest education level: Not on file   Occupational History    Occupation: Psychiatrist   Tobacco Use    Smoking status: Never    Smokeless tobacco: Never   Vaping Use    Vaping status: Never Used   Substance and Sexual Activity    Alcohol use: Yes     Alcohol/week: 4.0 standard drinks of alcohol     Types: 4 Standard drinks or equivalent per week     Comment: socially    Drug use: Never    Sexual activity: Yes     Partners: Male     Birth control/protection: I.U.D.   Other Topics Concern    Not on file   Social History Narrative    Not on file     Social Drivers of Health     Financial Resource Strain: Not on file   Food Insecurity: Not on file   Transportation Needs: Not on file   Physical Activity: Not on file   Stress: Not on file   Social Connections: Not on file   Intimate Partner Violence: Not on file   Housing Stability: Not on file      RX Allergies[4]     Current Medications[5]     Objective   Visit Vitals  /74   Pulse 76   Temp 36.6 °C (97.9 °F)   Resp 20   Wt 59.9 kg (132 lb)   BMI 24.14 kg/m²   OB Status Premenopausal   Smoking Status Never   BSA 1.62 m²     Physical Exam:  General: AAOx3, Cooperative  Head: normocephalic, atraumatic, no hair loss   Eyes: EOMI, conjunctiva clear, sclera  white, anicteric  Ears: hearing intact  Nose: no deformity, no crusting   Skin: Some faint erythema over the cheeks and forehead. No rashes, ulcers or photosensitive areas  MSK: Upper Extremities:  Beighton's score: 5/9   Hand/Fingers: No erythema, edema, tenderness or warmth at DIP, PIP, or MCP joints, FROM grossly. Good hand . No nodules. 1 possible Heberden's node   Wrists: No erythema, edema, warmth or tenderness at wrist, FROM grossly  Elbows: No tenderness, edema, erythema or warmth at elbows, FROM grossly. No nodules   Shoulders: No edema, erythema, tenderness or warmth at shoulders. FROM  Lower Extremities:   Hips: No obvious deformities. No joint tenderness, normal ROM grossly. Log roll test negative bilaterally. Rebeca test is positive bilaterally. No trochanteric bursae TTP  Knees: Mobile patellae. No tenderness, deformities, edema, rashes, or warmth, normal ROM grossly. No crepitus, no pes anserine bursa TTP   Ankles, feet: No deformities, tenderness, edema, erythema, ulceration, or warmth at the ankle or MTP/IP joints, normal ROM grossly  Spine: No spinal tenderness to palpation. No SI joint tenderness     Lab Results   Component Value Date    WBC 8.2 05/13/2025    HGB 13.0 05/13/2025    HCT 41.1 05/13/2025    MCV 97.4 05/13/2025     (H) 05/13/2025        Chemistry    Lab Results   Component Value Date/Time     07/19/2024 1015    K 4.7 07/19/2024 1015     07/19/2024 1015    CO2 30 07/19/2024 1015    BUN 13 07/19/2024 1015    CREATININE 1.02 07/19/2024 1015    Lab Results   Component Value Date/Time    CALCIUM 10.0 07/19/2024 1015    ALKPHOS 66 07/19/2024 1015    AST 17 07/19/2024 1015    ALT 15 07/19/2024 1015    BILITOT 0.8 07/19/2024 1015        Lab Results   Component Value Date    CRP <3.0 05/13/2025      Lab Results   Component Value Date    PITER NEGATIVE 05/13/2025    SEDRATE 2 05/13/2025     Lab Results   Component Value Date    NEUTROABS 4.66 04/28/2022      Lab Results    Component Value Date    HEPCAB NONREACTIVE 08/16/2021      Lab Results   Component Value Date    ALT 15 07/19/2024    AST 17 07/19/2024    ALKPHOS 66 07/19/2024    BILITOT 0.8 07/19/2024      Lab Results   Component Value Date    CALCIUM 10.0 07/19/2024     Colonoscopy Screening; Average Risk Patient  Table formatting from the original result was not included.  Impression  Mild edematous, erythematous, granular mucosa in the rectum; performed   cold forceps biopsy to rule out colitis  The terminal ileum, ileocecal valve, appendiceal orifice, cecum, ascending   colon, hepatic flexure, transverse colon, splenic flexure, descending   colon and sigmoid colon appeared normal. Performed random biopsy using   biopsy forceps.    Findings  Mild, patchy edematous, erythematous and granular mucosa in the rectum 15   cm from the anal verge; performed cold forceps biopsy to rule out colitis  The terminal ileum, ileocecal valve, appendiceal orifice, cecum, ascending   colon, hepatic flexure, transverse colon, splenic flexure, descending   colon and sigmoid colon appeared normal. Performed random biopsy using   biopsy forceps.    Recommendation  Await pathology results   Repeat colonoscopy in 5 years, due: 6/4/2030  Family history of colon cancer       Indication  Chronic diarrhea, Family history of ulcerative colitis    Post-Op Diagnosis  None    Staff  Staff Role   Sheba Bright MD Proceduralist     Medications  No administrations occurring from 1452 to 1601 on 06/05/25     Preprocedure  A history and physical has been performed, and patient medication   allergies have been reviewed. The patient's tolerance of previous   anesthesia has been reviewed. The risks and benefits of the procedure and   the sedation options and risks were discussed with the patient. All   questions were answered and informed consent obtained.    Details of the Procedure  The patient underwent moderate sedation, which was administered by the    procedural nurse. The patient's blood pressure, ECG, ETCO2, heart rate,   level of consciousness, oxygen and respirations were monitored throughout   the procedure. A digital rectal exam was performed. A perianal exam was   performed. The scope was introduced through the anus and advanced to the   terminal ileum. Retroflexion was performed in the rectum. The quality of   bowel preparation was evaluated using the [place] Bowel Preparation Scale   with scores of: right colon = 2, transverse colon = 3, left colon = 2. The   total BBPS score was 7. Bowel prep was adequate. The patient's estimated   blood loss was minimal. The procedure was not difficult. The patient   tolerated the procedure well. There were no apparent adverse events.     Events  Procedure Events   Event Event Time   ENDO SCOPE IN TIME 6/5/2025  3:22 PM   ENDO SCOPE OUT TIME 6/5/2025  3:36 PM   ENDO SCOPE IN TIME 6/5/2025  3:41 PM   ENDO CECUM REACHED 6/5/2025  3:45 PM     Specimens  ID Type Source Tests Collected by Time   1 : R/O CELIAC Tissue DUODENUM FIRST PART BIOPSY SURGICAL PATHOLOGY EXAM   [facility] 6/5/2025 1525   2 :  Tissue STOMACH ANTRUM BIOPSY SURGICAL PATHOLOGY EXAM Rocky Renae   6/5/2025 1531   3 : MULTIPLE POLYPS Tissue STOMACH ANTRUM  POLYPECTOMY SURGICAL PATHOLOGY   EXAM Rocky Renae 6/5/2025 1531   4 :  Tissue TERMINAL ILEUM BIOPSY SURGICAL PATHOLOGY EXAM Bijal Clements MA 6/5/2025 1553   5 :  Tissue COLON  - RANDOM BIOPSY SURGICAL PATHOLOGY EXAM Bijal Clements MA 6/5/2025 1554   6 : bx thickened wall @15 cm Tissue RECTUM BIOPSY SURGICAL PATHOLOGY EXAM   Bijal Clements MA 6/5/2025 1559     Procedure Location  [facility]  [address] [address] [facility] [address] [address] [address]  [phone]    Referring Provider  RADHA Chase-CNP    Procedure Provider  Sheba Bright MD  Esophagogastroduodenoscopy (EGD)  Table formatting from the original result was not included.  Impression  The  cricopharynx, upper third of the esophagus, middle third of the   esophagus, lower third of the esophagus and GE junction appeared normal.  Multiple polyps measuring from 8 mm up to 15 mm in the body of the   stomach; performed hot snare removal  Mild edematous, erythematous mucosa in the body of the stomach and antrum,   suggestive of gastritis; performed cold forceps biopsy to rule out H.   pylori  The duodenal bulb and 2nd part of the duodenum appeared normal. Performed   random biopsy to rule out celiac disease.    Findings  The cricopharynx, upper third of the esophagus, middle third of the   esophagus, lower third of the esophagus and GE junction appeared normal.  Multiple hyperplastic polyps measuring from 8 mm up to 15 mm in the body   of the stomach; performed hot snare with complete en bloc removal and   retrieved specimen  Mild, patchy edematous and erythematous mucosa in the body of the stomach   and antrum, suggestive of gastritis; performed cold forceps biopsy to rule   out H. pylori  The duodenal bulb and 2nd part of the duodenum appeared normal. Performed   random biopsy using biopsy forceps to rule out celiac disease.    Recommendation  Await pathology results     Indication  Gastroesophageal reflux disease, unspecified whether esophagitis present    Post-Op Diagnosis  None    Staff  Staff Role   Sheba Bright MD Proceduralist     Medications  No administrations occurring from 1452 to 1537 on 06/05/25     Preprocedure  A history and physical has been performed, and patient medication   allergies have been reviewed. The patient's tolerance of previous   anesthesia has been reviewed. The risks and benefits of the procedure and   the sedation options and risks were discussed with the patient. All   questions were answered and informed consent obtained.    Details of the Procedure  The patient underwent moderate sedation, which was administered by the   procedural nurse. The patient's blood pressure,  ECG, ETCO2, heart rate,   level of consciousness, oxygen and respirations were monitored throughout   the procedure. The scope was introduced through the mouth and advanced to   the second part of the duodenum. Retroflexion was performed in the cardia.   Prior to the procedure, the patient's H. Pylori status was unknown. The   patient's estimated blood loss was moderate. The procedure was not   difficult. The patient tolerated the procedure well. There were no   apparent adverse events.     Events  Procedure Events   Event Event Time   ENDO SCOPE IN TIME 6/5/2025  3:22 PM   ENDO SCOPE OUT TIME 6/5/2025  3:36 PM     Specimens  ID Type Source Tests Collected by Time   1 : R/O CELIAC Tissue DUODENUM FIRST PART BIOPSY SURGICAL PATHOLOGY EXAM   [facility] 6/5/2025 1525   2 :  Tissue STOMACH ANTRUM BIOPSY SURGICAL PATHOLOGY EXAM [facility]   6/5/2025 1531   3 : MULTIPLE POLYPS Tissue STOMACH ANTRUM  POLYPECTOMY SURGICAL PATHOLOGY   EXAM [facility] 6/5/2025 1531     Procedure Location  [facility]  [address] [address] [address]  [phone]    Referring Provider  Laila Angel, APRN-CNP    Procedure Provider  Sheba Bright MD     === 05/24/22 ===  FOOT  Normal radiographs of the right foot           Assessment/Plan    This is a 45 y.o. female with seasonal allergies, hypothyroidism, GERD, IBS, gluten sensitive enteropathy, interstitial nephritis, ADHD, RLS, asthma, iron deficiency, who presents for RD? referred by GI Jeanne Bustamante     Patient with many sx, risk factors and features consistent with FM. She also has hypermobility on exam with positive Rebeca tests bilaterally. Previous autoimmune work up is negative for PITER, RF, CRP and ESR. No imaging done. Will complete the work out to rule out RA or CTD, but less likely with a negative PITER. No synovitis on exam    Labs:  5/25: PLT 491K, TSH, FT4, ESR, CRP, vit D wnl  PITER, RF neg    Imaging:  No MSK imaging    #  Polyarthralgia  # Hypermobility  # FM  # RD   # Thrombocytosis, not properly worked up by hem     - Labs today  - Xrays today  - I will inform the patient of the results     Patient's labs results and records have been reviewed     RTC in 1 month for discussion of results, prefers virtually     Plan, including risks and benefits, was discussed with the patient, informed on how to reach us.     To schedule an appointment, call [phone]  To reach the rheumatology office, call [phone]    Char Schofield MD      Division of Rheumatology  [facility]            [1]   Patient Active Problem List  Diagnosis    Hypothyroid    Seasonal allergies    ADHD    GERD (gastroesophageal reflux disease)    IBS (irritable bowel syndrome)    Gluten-sensitive enteropathy    Restless leg syndrome    Low iron stores    Gastric polyp    Recurrent sinus infections    Elevated serum creatinine    Interstitial nephritis    Thrombocytosis    Yeast vaginitis    Asthma, unspecified asthma severity, unspecified whether complicated, unspecified whether persistent ([facility])   [2]   Past Medical History:  Diagnosis Date    ADHD 2023    ADHD (attention deficit hyperactivity disorder)     Allergic     Anemia     Anxiety     Arthritis 2000s    Asthma     Chronic kidney disease     Depression     Disease of thyroid gland     Eczema     GERD (gastroesophageal reflux disease) 2023    Headache 2018    Hypothyroid 2023    Low iron stores 2023    Restless leg syndrome 2023    Treated with gabapentin   [3]   Past Surgical History:  Procedure Laterality Date    ADENOIDECTOMY  1986     SECTION, LOW TRANSVERSE  2022    TONSILLECTOMY  1986    WISDOM TOOTH EXTRACTION     [4]   Allergies  Allergen Reactions    Other Unknown     Environmental allergies.    Bupropion Hives, Rash and Unknown    Morphine Itching, Rash and Swelling   [5]   Current  Outpatient Medications:     albuterol 90 mcg/actuation inhaler, every 4 hours., Disp: , Rfl:     amphetamine-dextroamphetamine (Adderall) 10 mg tablet, Take 1 tablet (10 mg) by mouth 2 times a day for 7 days., Disp: 14 tablet, Rfl: 0    amphetamine-dextroamphetamine XR (Mydayis) 25 mg 24 hr capsule, Take 1 capsule (25 mg) by mouth once daily in the morning. Do not crush or chew. Do not fill before March 20, 2025. (Patient not taking: Reported on 6/5/2025), Disp: 30 capsule, Rfl: 0    amphetamine-dextroamphetamine XR (Mydayis) 25 mg 24 hr capsule, Take 1 capsule (25 mg) by mouth once daily in the morning. Do not crush or chew. Do not fill before June 29, 2025. (Patient not taking: Reported on 6/5/2025 Do not fill before June 29, 2025.), Disp: 30 capsule, Rfl: 0    amphetamine-dextroamphetamine XR (Mydayis) 25 mg 24 hr capsule, Take 1 capsule (25 mg) by mouth once daily in the morning. Do not crush or chew. Do not fill before May 29, 2025., Disp: 30 capsule, Rfl: 0    amphetamine-dextroamphetamine XR (Mydayis) 25 mg 24 hr capsule, Take 1 capsule (25 mg) by mouth once daily in the morning. Do not crush or chew. (Patient not taking: Reported on 6/5/2025), Disp: 30 capsule, Rfl: 0    fluticasone (Flonase Allergy Relief) 50 mcg/actuation nasal spray, once every 24 hours., Disp: , Rfl:     gabapentin (Neurontin) 100 mg capsule, Take 1 capsule (100 mg) by mouth 3 times a day., Disp: 270 capsule, Rfl: 1    iron 18 mg tablet, Take by mouth., Disp: , Rfl:     levocetirizine (Xyzal) 5 mg tablet, TAKE 1 TABLET BY MOUTH EVERY DAY IN THE EVENING, Disp: 90 tablet, Rfl: 1    levonorgestrel (Mirena) 21 mcg/24 hours (8 yrs) 52 mg IUD, by intrauterine route., Disp: , Rfl:     levothyroxine (Synthroid, Levoxyl) 25 mcg tablet, Take 1 tablet (25 mcg) by mouth early in the morning.. Take on an empty stomach at the same time each day, either 30 to 60 minutes prior to breakfast, Disp: 90 tablet, Rfl: 1    magnesium oxide (Mag-Ox) 400 mg  tablet, 1 tablet (400 mg) once daily., Disp: , Rfl:     pantoprazole (ProtoNix) 20 mg EC tablet, TAKE 1 TABLET BY MOUTH EVERY DAY, Disp: 90 tablet, Rfl: 1

## 2025-07-18 ENCOUNTER — APPOINTMENT (OUTPATIENT)
Facility: CLINIC | Age: 45
End: 2025-07-18
Payer: COMMERCIAL

## 2025-07-22 ASSESSMENT — RHEUMATOLOGY NEW PATIENT QUESTIONNAIRE
DOUBLE OR BLURRED VISION: N
JOINT PAIN: Y
UNEXPLAINED WEIGHT CHANGE: Y
MORNING STIFFNESS: Y
PERSISTENT DIARRHEA: Y
UNUSUAL FATIGUE: Y
SHORTNESS OF BREATH: N
JOINT SWELLING: Y
RASH: N
FAINTING: N
VAGINAL DRYNESS: N
FEVER: N
LOSS OF VISION: N
MORNING STIFFNESS IN LOWER BACK: N
DIFFICULTY FALLING ASLEEP: Y
UNUSUAL BLEEDING: N
SKIN TIGHTNESS: N
EASILY LOSING TEMPER: Y
SORES IN MOUTH OR NOSE: N
SKIN REDNESS: Y
HEARTBURN OR REFLUX: Y
UNUSUALLY RAPID OR SLOWED HEART RATE: N
SWOLLEN OR TENDER GLANDS: Y
BEHAVIORAL CHANGES: N
DEPRESSION: N
AGITATION: N
BLACK STOOLS: N
ANXIETY: Y
EASY BRUISING: N
NODULES/BUMPS: N
UNEXPLAINED HEARING LOSS: N
ANEMIA: Y
STOMACH PAIN: Y
HEADACHES: Y
JAUNDICE: N
NAUSEA: N
INCREASED SUSCEPTIBILITY TO INFECTION: Y
VOMITING OF BLOOD OR COFFEE GROUND CONSISTENCY MATERIAL: N
SWOLLEN LEGS OR FEET: Y
DRYNESS OF MOUTH: N
MEMORY LOSS: N
EYE DRYNESS: N
HOARSE VOICE: N
HOW WOULD YOU DESCRIBE YOUR STIFFNESS ON AVERAGE: MODERATE
SUN SENSITIVE (SUN ALLERGY): Y
SEIZURES: N
ABNORMAL URINE: N
COUGH: N
NIGHT SWEATS: N
EYE REDNESS: N
CHEST PAIN: N
EYE PAIN: N
PAIN OR BURNING ON URINATION: N
EXCESSIVE HAIR LOSS (MORE THAN YOUR NORM): N
RASH OR ULCERS: N
DIFFICULTY STAYING ASLEEP: Y
MUSCLE WEAKNESS: Y
DIFFICULTY BREATHING LYING DOWN: N
NUMBNESS OR TINGLING IN HANDS OR FEET: Y
COLOR CHANGES OF HANDS OR FEET IN THE COLD: Y
DIFFICULTY SWALLOWING: N
BLOOD IN STOOLS: N
LOSS OF CONSCIOUSNESS: N

## 2025-07-23 ENCOUNTER — APPOINTMENT (OUTPATIENT)
Dept: RHEUMATOLOGY | Facility: CLINIC | Age: 45
End: 2025-07-23
Payer: COMMERCIAL

## 2025-07-23 ENCOUNTER — HOSPITAL ENCOUNTER (OUTPATIENT)
Dept: RADIOLOGY | Facility: CLINIC | Age: 45
Discharge: HOME | End: 2025-07-23
Payer: COMMERCIAL

## 2025-07-23 VITALS
TEMPERATURE: 97.9 F | DIASTOLIC BLOOD PRESSURE: 74 MMHG | RESPIRATION RATE: 20 BRPM | HEART RATE: 76 BPM | BODY MASS INDEX: 24.14 KG/M2 | WEIGHT: 132 LBS | SYSTOLIC BLOOD PRESSURE: 108 MMHG

## 2025-07-23 DIAGNOSIS — D75.839 THROMBOCYTOSIS: ICD-10-CM

## 2025-07-23 DIAGNOSIS — I73.00 RAYNAUD DISEASE WITHOUT GANGRENE: ICD-10-CM

## 2025-07-23 DIAGNOSIS — M79.7 FIBROMYALGIA: ICD-10-CM

## 2025-07-23 DIAGNOSIS — M25.50 HYPERMOBILITY ARTHRALGIA: ICD-10-CM

## 2025-07-23 DIAGNOSIS — D75.839 THROMBOCYTOSIS: Primary | ICD-10-CM

## 2025-07-23 DIAGNOSIS — M25.50 POLYARTHRALGIA: ICD-10-CM

## 2025-07-23 PROCEDURE — 73130 X-RAY EXAM OF HAND: CPT | Mod: 50

## 2025-07-23 PROCEDURE — 73130 X-RAY EXAM OF HAND: CPT | Mod: BILATERAL PROCEDURE | Performed by: RADIOLOGY

## 2025-07-23 PROCEDURE — 73521 X-RAY EXAM HIPS BI 2 VIEWS: CPT | Mod: BILATERAL PROCEDURE | Performed by: RADIOLOGY

## 2025-07-23 PROCEDURE — 1036F TOBACCO NON-USER: CPT | Performed by: STUDENT IN AN ORGANIZED HEALTH CARE EDUCATION/TRAINING PROGRAM

## 2025-07-23 PROCEDURE — 99205 OFFICE O/P NEW HI 60 MIN: CPT | Performed by: STUDENT IN AN ORGANIZED HEALTH CARE EDUCATION/TRAINING PROGRAM

## 2025-07-23 PROCEDURE — 73521 X-RAY EXAM HIPS BI 2 VIEWS: CPT

## 2025-07-25 ENCOUNTER — PROCEDURE VISIT (OUTPATIENT)
Dept: GASTROENTEROLOGY | Facility: CLINIC | Age: 45
End: 2025-07-25
Payer: COMMERCIAL

## 2025-07-25 ENCOUNTER — RESULTS FOLLOW-UP (OUTPATIENT)
Dept: RHEUMATOLOGY | Facility: CLINIC | Age: 45
End: 2025-07-25

## 2025-07-25 VITALS
HEART RATE: 72 BPM | BODY MASS INDEX: 24.18 KG/M2 | HEIGHT: 62 IN | DIASTOLIC BLOOD PRESSURE: 67 MMHG | TEMPERATURE: 98.8 F | WEIGHT: 131.4 LBS | SYSTOLIC BLOOD PRESSURE: 96 MMHG

## 2025-07-25 DIAGNOSIS — M25.50 HYPERMOBILITY ARTHRALGIA: Primary | ICD-10-CM

## 2025-07-25 DIAGNOSIS — M25.852 FEMOROACETABULAR IMPINGEMENT OF BOTH HIPS: ICD-10-CM

## 2025-07-25 DIAGNOSIS — M25.851 FEMOROACETABULAR IMPINGEMENT OF BOTH HIPS: ICD-10-CM

## 2025-07-25 DIAGNOSIS — K52.9 CHRONIC DIARRHEA: ICD-10-CM

## 2025-07-25 PROCEDURE — 91065 BREATH HYDROGEN/METHANE TEST: CPT

## 2025-07-25 PROCEDURE — 91065 BREATH HYDROGEN/METHANE TEST: CPT | Performed by: NURSE PRACTITIONER

## 2025-07-25 NOTE — PROGRESS NOTES
Patient ID: Yaneli Cain is a 45 y.o. female.    Breath Hydrogen Test-Bacterial Overgrowth    Date/Time: 7/25/2025 3:20 PM    Performed by: Delaney Carty MA  Authorized by: CLAUDIA Chase    Consent:     Consent obtained:  Verbal    Consent given by:  Patient  Universal protocol:     Procedure explained and questions answered to patient or proxy's satisfaction: yes      Patient identity confirmed:  Verbally with patient  Procedure specific details:      Patient had increased gas and burping with burning sensation in lower abdomen and discomfort following substrate.   Post-procedure details:     Procedure completion:  Tolerated  Hydrogen Breath Analysis Consultation Sheet    Referring Provider: CLAUDIA Chase  [address] [facility], [place] [address]    Indication: Rule out SIBO    Symptoms: diarrhea    Age: 45 y.o.  Weight:   Vitals:    07/25/25 1520   Weight: 59.6 kg (131 lb 6.4 oz)     Substrate: dextrose   Dose: 75 gram    Last Meal: soft boiled eggs, white bread, chicken and rice, last meal at 7 pm.   Recent Antibiotics: patient denies    RESULTS:   Time PPM (H2) APPM* (CH4) CO2 Correction   Baseline #1 1250 2 8 3.6 1.52   Baseline #2 1252 1 7 3.9 1.41   *Challenge Dose Sugar: 1300  75 gram Dextrose  15' 1315 5 9 4.4 1.25   30' 1330 5 9 4.1 1.34   45' 1345 7 10 3.9 1.41   60' 1400 9 9 4.4 1.25   75' 1415 11 10 4.5 1.22   90' 1430 12 10 4.6 1.19   105' 1445 13 10 4.4 1.25   120' 1500 9 10 4.2 1.30   135' 1515 6 9 4.5 1.22   150'        165'        180'          Impression: Negative for SIBO and methane

## 2025-07-27 LAB
ANCA AB SER QL: NORMAL
APPEARANCE UR: CLEAR
BILIRUB UR QL STRIP: NEGATIVE
C3 SERPL-MCNC: 127 MG/DL (ref 83–193)
C4 SERPL-MCNC: 24 MG/DL (ref 15–57)
CCP IGG SERPL-ACNC: <16 UNITS
CK SERPL-CCNC: 50 U/L (ref 20–239)
COLOR UR: YELLOW
CREAT UR-MCNC: 82 MG/DL (ref 20–275)
CRP SERPL-MCNC: <3 MG/L
GLUCOSE UR QL STRIP: NEGATIVE
HGB UR QL STRIP: NEGATIVE
HLA-B27 QL NAA+PROBE: NORMAL
KETONES UR QL STRIP: NEGATIVE
LEUKOCYTE ESTERASE UR QL STRIP: NEGATIVE
MYELOPEROXIDASE AB SER IA-ACNC: <1 AI
NITRITE UR QL STRIP: NEGATIVE
PH UR STRIP: 6.5 [PH] (ref 5–8)
PROT UR QL STRIP: NEGATIVE
PROT UR-MCNC: 5 MG/DL (ref 5–24)
PROT/CREAT UR: 0.06 MG/MG CREAT (ref 0.02–0.18)
PROT/CREAT UR: 61 MG/G CREAT (ref 24–184)
PROTEINASE3 AB SER IA-ACNC: <1 AI
QUEST HLAB27 TYPING RESULTS REVIEWED BY:: NORMAL
SP GR UR STRIP: 1.01 (ref 1–1.03)
URATE SERPL-MCNC: 3.7 MG/DL (ref 2.5–7)

## 2025-07-30 ENCOUNTER — APPOINTMENT (OUTPATIENT)
Facility: CLINIC | Age: 45
End: 2025-07-30
Payer: COMMERCIAL

## 2025-07-30 VITALS
DIASTOLIC BLOOD PRESSURE: 76 MMHG | HEART RATE: 69 BPM | HEIGHT: 62 IN | TEMPERATURE: 97.4 F | WEIGHT: 131 LBS | RESPIRATION RATE: 18 BRPM | BODY MASS INDEX: 24.11 KG/M2 | SYSTOLIC BLOOD PRESSURE: 110 MMHG | OXYGEN SATURATION: 100 %

## 2025-07-30 DIAGNOSIS — R53.83 OTHER FATIGUE: ICD-10-CM

## 2025-07-30 DIAGNOSIS — F90.0 ATTENTION DEFICIT HYPERACTIVITY DISORDER (ADHD), PREDOMINANTLY INATTENTIVE TYPE: Primary | ICD-10-CM

## 2025-07-30 DIAGNOSIS — M19.90 OSTEOARTHRITIS, UNSPECIFIED OSTEOARTHRITIS TYPE, UNSPECIFIED SITE: ICD-10-CM

## 2025-07-30 DIAGNOSIS — N95.1 PERIMENOPAUSAL: ICD-10-CM

## 2025-07-30 DIAGNOSIS — K58.9 IRRITABLE BOWEL SYNDROME, UNSPECIFIED TYPE: ICD-10-CM

## 2025-07-30 PROCEDURE — 1036F TOBACCO NON-USER: CPT | Performed by: FAMILY MEDICINE

## 2025-07-30 PROCEDURE — 99214 OFFICE O/P EST MOD 30 MIN: CPT | Performed by: FAMILY MEDICINE

## 2025-07-30 PROCEDURE — 3008F BODY MASS INDEX DOCD: CPT | Performed by: FAMILY MEDICINE

## 2025-07-30 RX ORDER — DEXTROAMPHETAMINE SACCHARATE, AMPHETAMINE ASPARTATE MONOHYDRATE, DEXTROAMPHETAMINE SULFATE, AMPHETAMINE SULFATE 6.25; 6.25; 6.25; 6.25 MG/1; MG/1; MG/1; MG/1
25 CAPSULE, EXTENDED RELEASE ORAL EVERY MORNING
Qty: 30 CAPSULE | Refills: 0 | Status: SHIPPED | OUTPATIENT
Start: 2025-08-30 | End: 2025-09-29

## 2025-07-30 RX ORDER — DEXTROAMPHETAMINE SACCHARATE, AMPHETAMINE ASPARTATE MONOHYDRATE, DEXTROAMPHETAMINE SULFATE, AMPHETAMINE SULFATE 6.25; 6.25; 6.25; 6.25 MG/1; MG/1; MG/1; MG/1
25 CAPSULE, EXTENDED RELEASE ORAL EVERY MORNING
Qty: 30 CAPSULE | Refills: 0 | Status: SHIPPED | OUTPATIENT
Start: 2025-09-30 | End: 2025-10-30

## 2025-07-30 RX ORDER — DEXTROAMPHETAMINE SACCHARATE, AMPHETAMINE ASPARTATE MONOHYDRATE, DEXTROAMPHETAMINE SULFATE, AMPHETAMINE SULFATE 6.25; 6.25; 6.25; 6.25 MG/1; MG/1; MG/1; MG/1
25 CAPSULE, EXTENDED RELEASE ORAL EVERY MORNING
Qty: 30 CAPSULE | Refills: 0 | Status: SHIPPED | OUTPATIENT
Start: 2025-07-30 | End: 2025-08-29

## 2025-07-30 RX ORDER — DEXTROAMPHETAMINE SACCHARATE, AMPHETAMINE ASPARTATE MONOHYDRATE, DEXTROAMPHETAMINE SULFATE, AMPHETAMINE SULFATE 6.25; 6.25; 6.25; 6.25 MG/1; MG/1; MG/1; MG/1
25 CAPSULE, EXTENDED RELEASE ORAL EVERY MORNING
Qty: 30 CAPSULE | Refills: 0 | Status: CANCELLED | OUTPATIENT
Start: 2025-07-30 | End: 2025-08-29

## 2025-07-30 RX ORDER — DULOXETIN HYDROCHLORIDE 20 MG/1
20 CAPSULE, DELAYED RELEASE ORAL DAILY
COMMUNITY

## 2025-07-30 RX ORDER — DICLOFENAC SODIUM 10 MG/G
4 GEL TOPICAL 4 TIMES DAILY
Qty: 100 G | Refills: 1 | Status: SHIPPED | OUTPATIENT
Start: 2025-07-30

## 2025-07-30 NOTE — PROGRESS NOTES
Subjective   Patient ID: Yaneli Cain is a 45 y.o. female who presents for ADHD.  She has been getting worked up and celiac and crohn's/colitis has been ruled out.  She just had some inflammation.  Rheumatology did some labs too which were normal.  She did a SIBO test recently as well.  She didn't get the results yet.  She feels like all of her symptoms were reproduced when she did this test.      She saw rheumatology as well and labs were normal but Xrays showed early arthritis.      She has noticed that her fatigue is very cyclical. She has an IUD.  She feels like maybe it's perimenopausal.  She is still having fatigue and brain fog.  She has mirena IUD in.  She is interested in HRT possibly.  She sees Dr. Armando.      ADHD        Review of Systems    Current Outpatient Medications   Medication Instructions    albuterol 90 mcg/actuation inhaler Every 4 hours    amphetamine-dextroamphetamine XR (Mydayis) 25 mg 24 hr capsule 25 mg, oral, Every morning, Do not crush or chew.    amphetamine-dextroamphetamine XR (Mydayis) 25 mg 24 hr capsule 25 mg, oral, Every morning, Do not crush or chew.    amphetamine-dextroamphetamine XR (Mydayis) 25 mg 24 hr capsule 25 mg, oral, Every morning, Do not crush or chew.    amphetamine-dextroamphetamine XR (Mydayis) 25 mg 24 hr capsule 25 mg, oral, Every morning, Do not crush or chew.    amphetamine-dextroamphetamine XR (Mydayis) 25 mg 24 hr capsule 25 mg, oral, Every morning, Do not crush or chew.    [START ON 8/30/2025] amphetamine-dextroamphetamine XR (Mydayis) 25 mg 24 hr capsule 25 mg, oral, Every morning, Do not crush or chew.    [START ON 9/30/2025] amphetamine-dextroamphetamine XR (Mydayis) 25 mg 24 hr capsule 25 mg, oral, Every morning, Do not crush or chew.    diclofenac sodium (VOLTAREN) 4 g, Topical, 4 times daily    DULoxetine (CYMBALTA) 20 mg, Daily    fluticasone (Flonase Allergy Relief) 50 mcg/actuation nasal spray Every 24 hours    gabapentin (NEURONTIN) 100 mg, oral, 3  "times daily    iron 18 mg tablet Take by mouth.    levocetirizine (XYZAL) 5 mg, oral, Every evening    levonorgestrel (Mirena) 21 mcg/24 hours (8 yrs) 52 mg IUD by intrauterine route.    levothyroxine (SYNTHROID, LEVOXYL) 25 mcg, oral, Daily, Take on an empty stomach at the same time each day, either 30 to 60 minutes prior to breakfast    magnesium oxide (MAG-OX) 400 mg, Daily    pantoprazole (PROTONIX) 20 mg, oral, Daily       RX Allergies[1]  Other, Bupropion, and Morphine    Past Medical History:  2010: ADHD (attention deficit hyperactivity disorder)  2019: Anemia  2010: Anxiety  2000s: Arthritis  2006: Asthma  2022: Chronic kidney disease  2013: Depression  2021: Disease of thyroid gland  2015: Eczema  05/12/2023: GERD (gastroesophageal reflux disease)  2018: Headache  05/12/2023: Hypothyroid  2015: Irritable bowel syndrome  05/12/2023: Low iron stores  05/12/2023: Restless leg syndrome      Comment:  Treated with gabapentin    Social History     Tobacco Use    Smoking status: Never    Smokeless tobacco: Never   Substance Use Topics    Alcohol use: Yes     Alcohol/week: 4.0 standard drinks of alcohol     Types: 4 Standard drinks or equivalent per week     Comment: socially       Objective     Vitals:    07/30/25 1214   BP: 110/76   Pulse: 69   Resp: 18   Temp: 36.3 °C (97.4 °F)   TempSrc: Temporal   SpO2: 100%   Weight: 59.4 kg (131 lb)   Height: 1.575 m (5' 2\")     No LMP recorded. Patient is premenopausal.    Physical Exam  Constitutional:       Appearance: Normal appearance.     Neurological:      General: No focal deficit present.      Mental Status: She is alert and oriented to person, place, and time.         Assessment/Plan   Diagnoses and all orders for this visit:  Attention deficit hyperactivity disorder (ADHD), predominantly inattentive type  -     Drug Screen, Urine With Reflex to Confirmation; Future  -     Amphetamine Confirm, Urine; Future  -     amphetamine-dextroamphetamine XR (Mydayis) 25 mg 24 " hr capsule; Take 1 capsule (25 mg) by mouth once daily in the morning. Do not crush or chew.  -     amphetamine-dextroamphetamine XR (Mydayis) 25 mg 24 hr capsule; Take 1 capsule (25 mg) by mouth once daily in the morning. Do not crush or chew. Do not fill before August 30, 2025.  -     amphetamine-dextroamphetamine XR (Mydayis) 25 mg 24 hr capsule; Take 1 capsule (25 mg) by mouth once daily in the morning. Do not crush or chew. Do not fill before September 30, 2025.  Other fatigue  Perimenopausal  Comments:  Will discuss with Dr. Armando; has IUD interested in estradiol patch  Osteoarthritis, unspecified osteoarthritis type, unspecified site  -     diclofenac sodium (Voltaren) 1 % gel; Apply 4.5 inches (4 g) topically 4 times a day.  Irritable bowel syndrome, unspecified type  Comments:  Working continuing with GI                 [1]   Allergies  Allergen Reactions    Other Unknown     Environmental allergies.    Bupropion Hives, Rash and Unknown    Morphine Itching, Rash and Swelling

## 2025-07-30 NOTE — PATIENT INSTRUCTIONS
Today we followed up on your ADHD medication.  You are doing well.      Follow up in 6 months to monitor your medication   For your hip early oa I have prescribed voltaren gel    For your fatigue and perimenopause you will discuss with Dr. Armando    Follow up in 6 months for a medication check and/or Annual wellness

## 2025-07-31 ENCOUNTER — TELEPHONE (OUTPATIENT)
Dept: OBSTETRICS AND GYNECOLOGY | Facility: CLINIC | Age: 45
End: 2025-07-31

## 2025-07-31 ENCOUNTER — TELEPHONE (OUTPATIENT)
Dept: GASTROENTEROLOGY | Facility: CLINIC | Age: 45
End: 2025-07-31
Payer: COMMERCIAL

## 2025-07-31 DIAGNOSIS — K52.9 CHRONIC DIARRHEA: Primary | ICD-10-CM

## 2025-07-31 DIAGNOSIS — K58.0 IRRITABLE BOWEL SYNDROME WITH DIARRHEA: ICD-10-CM

## 2025-07-31 DIAGNOSIS — Z83.79 FAMILY HISTORY OF ULCERATIVE COLITIS: ICD-10-CM

## 2025-07-31 LAB
ANCA AB SER QL: NEGATIVE
APPEARANCE UR: CLEAR
BILIRUB UR QL STRIP: NEGATIVE
C3 SERPL-MCNC: 127 MG/DL (ref 83–193)
C4 SERPL-MCNC: 24 MG/DL (ref 15–57)
CCP IGG SERPL-ACNC: <16 UNITS
CK SERPL-CCNC: 50 U/L (ref 20–239)
COLOR UR: YELLOW
CREAT UR-MCNC: 82 MG/DL (ref 20–275)
CRP SERPL-MCNC: <3 MG/L
GLUCOSE UR QL STRIP: NEGATIVE
HGB UR QL STRIP: NEGATIVE
HLA-B27 QL NAA+PROBE: NEGATIVE
KETONES UR QL STRIP: NEGATIVE
LEUKOCYTE ESTERASE UR QL STRIP: NEGATIVE
MYELOPEROXIDASE AB SER IA-ACNC: <1 AI
NITRITE UR QL STRIP: NEGATIVE
PH UR STRIP: 6.5 [PH] (ref 5–8)
PROT UR QL STRIP: NEGATIVE
PROT UR-MCNC: 5 MG/DL (ref 5–24)
PROT/CREAT UR: 0.06 MG/MG CREAT (ref 0.02–0.18)
PROT/CREAT UR: 61 MG/G CREAT (ref 24–184)
PROTEINASE3 AB SER IA-ACNC: <1 AI
QUEST HLAB27 TYPING RESULTS REVIEWED BY:: NORMAL
SP GR UR STRIP: 1.01 (ref 1–1.03)
URATE SERPL-MCNC: 3.7 MG/DL (ref 2.5–7)

## 2025-07-31 NOTE — TELEPHONE ENCOUNTER
Result Communication    No results found from the In Basket message.    12:57 PM      Results were successfully communicated with the patient and they acknowledged their understanding.

## 2025-08-01 ENCOUNTER — SPECIALTY PHARMACY (OUTPATIENT)
Dept: PHARMACY | Facility: CLINIC | Age: 45
End: 2025-08-01

## 2025-08-02 LAB
AMPHET UR-MCNC: 589 NG/ML
AMPHETAMINES UR QL: NEGATIVE NG/ML
BARBITURATES UR QL: NEGATIVE NG/ML
BENZODIAZ UR QL: NEGATIVE NG/ML
BZE UR QL: NEGATIVE NG/ML
CREAT UR-MCNC: 11.8 MG/DL
FENTANYL UR QL SCN: NEGATIVE NG/ML
MDA UR-MCNC: NEGATIVE NG/ML
MDEA UR-MCNC: NEGATIVE NG/ML
MDMA UR-MCNC: NEGATIVE NG/ML
METHADONE UR QL: NEGATIVE NG/ML
METHAMPHET UR-MCNC: NEGATIVE NG/ML
OPIATES UR QL: NEGATIVE NG/ML
OXIDANTS UR QL: NEGATIVE MCG/ML
OXYCODONE UR QL: NEGATIVE NG/ML
PCP UR QL: NEGATIVE NG/ML
PH UR: 7.3 [PH] (ref 4.5–9)
PHENTERMINE UR-MCNC: NEGATIVE NG/ML
QUEST ABNORMAL SPECIMEN VALIDITY TEST:: ABNORMAL
QUEST NOTES AND COMMENTS: ABNORMAL
SP GR UR: 1
THC UR QL: NEGATIVE NG/ML

## 2025-08-22 ENCOUNTER — APPOINTMENT (OUTPATIENT)
Dept: PHYSICAL THERAPY | Facility: CLINIC | Age: 45
End: 2025-08-22
Payer: COMMERCIAL

## 2025-08-26 ENCOUNTER — APPOINTMENT (OUTPATIENT)
Dept: RADIOLOGY | Facility: HOSPITAL | Age: 45
End: 2025-08-26
Payer: COMMERCIAL

## 2025-08-29 ENCOUNTER — APPOINTMENT (OUTPATIENT)
Dept: RADIOLOGY | Facility: HOSPITAL | Age: 45
End: 2025-08-29
Payer: COMMERCIAL

## 2025-08-29 ENCOUNTER — EVALUATION (OUTPATIENT)
Dept: PHYSICAL THERAPY | Facility: CLINIC | Age: 45
End: 2025-08-29
Payer: COMMERCIAL

## 2025-08-29 DIAGNOSIS — M25.552 BILATERAL HIP PAIN: Primary | ICD-10-CM

## 2025-08-29 DIAGNOSIS — M25.551 BILATERAL HIP PAIN: Primary | ICD-10-CM

## 2025-08-29 PROCEDURE — 97110 THERAPEUTIC EXERCISES: CPT | Mod: GP | Performed by: PHYSICAL THERAPIST

## 2025-08-29 PROCEDURE — 97161 PT EVAL LOW COMPLEX 20 MIN: CPT | Mod: GP | Performed by: PHYSICAL THERAPIST

## 2025-09-05 ENCOUNTER — TREATMENT (OUTPATIENT)
Dept: PHYSICAL THERAPY | Facility: CLINIC | Age: 45
End: 2025-09-05
Payer: COMMERCIAL

## 2025-09-05 DIAGNOSIS — M25.552 BILATERAL HIP PAIN: ICD-10-CM

## 2025-09-05 DIAGNOSIS — M25.551 BILATERAL HIP PAIN: ICD-10-CM

## 2025-09-05 PROCEDURE — 97110 THERAPEUTIC EXERCISES: CPT | Mod: GP | Performed by: PHYSICAL THERAPIST

## 2025-09-05 PROCEDURE — 97140 MANUAL THERAPY 1/> REGIONS: CPT | Mod: GP | Performed by: PHYSICAL THERAPIST

## 2025-09-16 ENCOUNTER — APPOINTMENT (OUTPATIENT)
Dept: OBSTETRICS AND GYNECOLOGY | Facility: CLINIC | Age: 45
End: 2025-09-16
Payer: COMMERCIAL

## 2025-09-22 ENCOUNTER — APPOINTMENT (OUTPATIENT)
Dept: RHEUMATOLOGY | Facility: CLINIC | Age: 45
End: 2025-09-22
Payer: COMMERCIAL

## 2025-10-10 ENCOUNTER — APPOINTMENT (OUTPATIENT)
Facility: CLINIC | Age: 45
End: 2025-10-10
Payer: COMMERCIAL

## 2025-10-31 ENCOUNTER — APPOINTMENT (OUTPATIENT)
Dept: OBSTETRICS AND GYNECOLOGY | Facility: CLINIC | Age: 45
End: 2025-10-31
Payer: COMMERCIAL